# Patient Record
Sex: FEMALE | Race: WHITE | HISPANIC OR LATINO | Employment: FULL TIME | URBAN - METROPOLITAN AREA
[De-identification: names, ages, dates, MRNs, and addresses within clinical notes are randomized per-mention and may not be internally consistent; named-entity substitution may affect disease eponyms.]

---

## 2017-03-15 ENCOUNTER — OFFICE VISIT (OUTPATIENT)
Dept: FAMILY MEDICINE | Facility: CLINIC | Age: 37
End: 2017-03-15
Payer: COMMERCIAL

## 2017-03-15 VITALS
HEART RATE: 56 BPM | RESPIRATION RATE: 16 BRPM | DIASTOLIC BLOOD PRESSURE: 80 MMHG | SYSTOLIC BLOOD PRESSURE: 124 MMHG | WEIGHT: 168.3 LBS | OXYGEN SATURATION: 100 % | HEIGHT: 59 IN | BODY MASS INDEX: 33.93 KG/M2 | TEMPERATURE: 98.2 F

## 2017-03-15 DIAGNOSIS — G47.26 SHIFT WORK SLEEP DISORDER: ICD-10-CM

## 2017-03-15 DIAGNOSIS — G43.909 MIGRAINE WITHOUT STATUS MIGRAINOSUS, NOT INTRACTABLE, UNSPECIFIED MIGRAINE TYPE: Primary | ICD-10-CM

## 2017-03-15 PROBLEM — Z13.6 CARDIOVASCULAR SCREENING; LDL GOAL LESS THAN 160: Status: ACTIVE | Noted: 2017-03-15

## 2017-03-15 PROCEDURE — 99203 OFFICE O/P NEW LOW 30 MIN: CPT | Performed by: FAMILY MEDICINE

## 2017-03-15 RX ORDER — BUTALBITAL, ACETAMINOPHEN AND CAFFEINE 50; 325; 40 MG/1; MG/1; MG/1
1 TABLET ORAL EVERY 4 HOURS PRN
Qty: 40 TABLET | Refills: 2 | Status: SHIPPED
Start: 2017-03-15 | End: 2017-10-30

## 2017-03-15 RX ORDER — NORTRIPTYLINE HCL 25 MG
25-50 CAPSULE ORAL AT BEDTIME
Qty: 60 CAPSULE | Refills: 2 | Status: SHIPPED | OUTPATIENT
Start: 2017-03-15 | End: 2020-10-13

## 2017-03-15 RX ORDER — BUTALBITAL, ACETAMINOPHEN AND CAFFEINE 50; 325; 40 MG/1; MG/1; MG/1
1 TABLET ORAL EVERY 4 HOURS PRN
COMMUNITY
End: 2017-03-15

## 2017-03-15 ASSESSMENT — PAIN SCALES - GENERAL: PAINLEVEL: NO PAIN (0)

## 2017-03-15 NOTE — PROGRESS NOTES
SUBJECTIVE:                                                    Katherine Garcia is a 36 year old female who presents to clinic today for the following health issues:      Headache     Onset: ongoing issue - getting migraines more frequently during work week (pt works 7 on, 7 off - nights)    Description:   Location: bilateral in the frontal area, bilateral in the temporal area   Character: sharp pain, squeezing pain  Frequency:  2 days weekly (during work week)  Duration:  3 days if not taking medications    Intensity: severe    Progression of Symptoms:  worsening    Accompanying Signs & Symptoms:  Stiff neck: no  Neck or upper back pain: no  Fever: no  Sinus pressure: no  Nausea or vomiting: no  Dizziness: YES  Numbness: no  Weakness: YES  Visual changes: no   History:   Head trauma: no  Family history of migraines: no  Previous tests for headaches: YES  Neurologist evaluations: YES- long while   Able to do daily activities: YES  Wake with a headaches: no  Do headaches wake you up: YES- in the past  Daily pain medication use: YES- pt tries Ibuprofen with early onset of sx's  Work/school stressors/changes: no    Precipitating factors:   Does light make it worse: YES  Does sound make it worse: YES- can    Alleviating factors:  Does sleep help: no         Therapies Tried and outcome: Fioricet and Ibuprofen (Advil, Motrin)    SUBJECTIVE:  Here today to discuss migraines, with symptoms as above. New to our clinic - I take care of her  as well. She has had a long-standing history with migraine headaches. Has tried Imitrex and similar in the past but this worsens her headaches. Check she responds quite well to Fioricet. Has been working overnights with some shifting schedules this is been making sleep difficult. That seems to have made her headaches more frequent. Also having trouble with falling asleep at work.    Review of systems otherwise negative.  Past medical, family, and social history reviewed and  "updated in chart.    OBJECTIVE:  /80 (BP Location: Right arm, Patient Position: Right side, Cuff Size: Adult Regular)  Pulse 56  Temp 98.2  F (36.8  C) (Oral)  Resp 16  Ht 1.499 m (4' 11\")  Wt 76.3 kg (168 lb 4.8 oz)  LMP  (Exact Date)  SpO2 100%  BMI 33.99 kg/m2  Alert, pleasant, upbeat, and in no apparent discomfort.  Eye exam is normal - PONCE, EOMI, fundi normal, corneas normal, no foreign bodies, visual acuity normal both eyes, no periorbital cellulitis.   Ears normal. Throat and pharynx normal. Neck supple. No adenopathy or masses in the neck or supraclavicular regions. Sinuses non tender.  S1 and S2 normal, no murmurs, clicks, gallops or rubs. Regular rate and rhythm. Chest is clear; no wheezes or rales. No edema or JVD.  I note only benign skin findings. No unusual rashes or suspicious skin lesions noted. Nails appear normal.     ASSESSMENT / PLAN:  (G43.409) Migraine without status migrainosus, not intractable, unspecified migraine type  (primary encounter diagnosis)  Comment: From a treatment standpoint the Fioricet has been working great and I will provide refills. From a prevention standpoint my guess is improving her sleep will help lessen the frequency and intensity. We will try nortriptyline 1-2 tablets before sleep and see if that can't make things more efficient, help with the headaches, lessen drowsiness at work  Plan: butalbital-acetaminophen-caffeine (FIORICET)         -40 MG per tablet        Discussed mechanism of action of the proposed medication, as well as potential effects, both good and bad.  Patient expressed understanding and agreed with treatment.     (G47.26) Shift work sleep disorder  Comment: As above  Plan: butalbital-acetaminophen-caffeine (FIORICET)         -40 MG per tablet, nortriptyline         (PAMELOR) 25 MG capsule            Follow up - contact me in 2-3 weeks  ELISEO Gambino MD    (Chart documentation completed in part with Dragon voice-recognition " software.  Even though reviewed some grammatical, spelling, and word errors may remain.)

## 2017-03-15 NOTE — NURSING NOTE
"Chief Complaint   Patient presents with     Headache       Initial /80 (BP Location: Right arm, Patient Position: Right side, Cuff Size: Adult Regular)  Pulse 56  Temp 98.2  F (36.8  C) (Oral)  Resp 16  Ht 1.499 m (4' 11\")  Wt 76.3 kg (168 lb 4.8 oz)  LMP  (Exact Date)  SpO2 100%  BMI 33.99 kg/m2 Estimated body mass index is 33.99 kg/(m^2) as calculated from the following:    Height as of this encounter: 1.499 m (4' 11\").    Weight as of this encounter: 76.3 kg (168 lb 4.8 oz).  Medication Reconciliation: complete     Will Sandeep COTO      "

## 2017-03-15 NOTE — MR AVS SNAPSHOT
"              After Visit Summary   3/15/2017    Katherine Garcia    MRN: [id number]           Patient Information     Date Of Birth          1980        Visit Information        Provider Department      3/15/2017 9:40 AM Margret Gambino MD [facility]        Today's Diagnoses     Migraine without status migrainosus, not intractable, unspecified migraine type    -  1    Shift work sleep disorder           Follow-ups after your visit        Follow-up notes from your care team     Return if symptoms worsen or fail to improve.      Who to contact     If you have questions or need follow up information about today's clinic visit or your schedule please contact [facility] directly at [phone].  Normal or non-critical lab and imaging results will be communicated to you by MyChart, letter or phone within 4 business days after the clinic has received the results. If you do not hear from us within 7 days, please contact the clinic through MyChart or phone. If you have a critical or abnormal lab result, we will notify you by phone as soon as possible.  Submit refill requests through [employer] or call your pharmacy and they will forward the refill request to us. Please allow 3 business days for your refill to be completed.          Additional Information About Your Visit        MyChart Information     [employer] lets you send messages to your doctor, view your test results, renew your prescriptions, schedule appointments and more. To sign up, go to [email or web address] . Click on \"Log in\" on the left side of the screen, which will take you to the Welcome page. Then click on \"Sign up Now\" on the right side of the page.     You will be asked to enter the access code listed below, as well as some personal information. Please follow the directions to create your username and password.     Your access code is: [email or web address]  Expires: 6/13/2017 10:01 AM     Your access code will " " in 90 days. If you need help or a new code, please call your [facility] or [phone].        Care EveryWhere ID     This is your Care EveryWhere ID. This could be used by other organizations to access your [place] medical records  [id number]        Your Vitals Were     Pulse Temperature Respirations Height Last Period Pulse Oximetry    56 98.2  F (36.8  C) (Oral) 16 1.499 m (4' 11\") (Exact Date) 100%    BMI (Body Mass Index)                   33.99 kg/m2            Blood Pressure from Last 3 Encounters:   03/15/17 124/80   13 118/70    Weight from Last 3 Encounters:   03/15/17 76.3 kg (168 lb 4.8 oz)   13 79.4 kg (175 lb)              Today, you had the following     No orders found for display         Today's Medication Changes          These changes are accurate as of: 3/15/17 10:01 AM.  If you have any questions, ask your nurse or doctor.               Start taking these medicines.        Dose/Directions    nortriptyline 25 MG capsule   Commonly known as:  PAMELOR   Used for:  Shift work sleep disorder, Migraine without status migrainosus, not intractable, unspecified migraine type   Started by:  Margret Gambino MD        Dose:  25-50 mg   Take 1-2 capsules (25-50 mg) by mouth At Bedtime   Quantity:  60 capsule   Refills:  2            Where to get your medicines      [facility] [address] [place] [address] AT [facility]  [address] [place] [address]     Phone:  [phone]     butalbital-acetaminophen-caffeine -40 MG per tablet    nortriptyline 25 MG capsule                Primary Care Provider Office Phone #    [facility] [phone]       No address on file        Thank you!     Thank you for choosing [facility]  for your care. Our goal is always to provide you with excellent care. Hearing back from our patients is one way we can continue to " improve our services. Please take a few minutes to complete the written survey that you may receive in the mail after your visit with us. Thank you!             Your Updated Medication List - Protect others around you: Learn how to safely use, store and throw away your medicines at www.disposemymeds.org.          This list is accurate as of: 3/15/17 10:01 AM.  Always use your most recent med list.                   Brand Name Dispense Instructions for use    butalbital-acetaminophen-caffeine -40 MG per tablet    FIORICET    40 tablet    Take 1 tablet by mouth every 4 hours as needed       nortriptyline 25 MG capsule    PAMELOR    60 capsule    Take 1-2 capsules (25-50 mg) by mouth At Bedtime

## 2017-07-10 ENCOUNTER — APPOINTMENT (OUTPATIENT)
Dept: CT IMAGING | Facility: CLINIC | Age: 37
DRG: 758 | End: 2017-07-10
Attending: EMERGENCY MEDICINE
Payer: COMMERCIAL

## 2017-07-10 ENCOUNTER — HOSPITAL ENCOUNTER (INPATIENT)
Facility: CLINIC | Age: 37
LOS: 1 days | Discharge: HOME | DRG: 758 | End: 2017-07-12
Attending: EMERGENCY MEDICINE | Admitting: OBSTETRICS & GYNECOLOGY
Payer: COMMERCIAL

## 2017-07-10 ENCOUNTER — OFFICE VISIT (OUTPATIENT)
Dept: URGENT CARE | Facility: URGENT CARE | Age: 37
End: 2017-07-10
Payer: COMMERCIAL

## 2017-07-10 ENCOUNTER — APPOINTMENT (OUTPATIENT)
Dept: ULTRASOUND IMAGING | Facility: CLINIC | Age: 37
DRG: 758 | End: 2017-07-10
Attending: EMERGENCY MEDICINE
Payer: COMMERCIAL

## 2017-07-10 ENCOUNTER — RADIANT APPOINTMENT (OUTPATIENT)
Dept: GENERAL RADIOLOGY | Facility: CLINIC | Age: 37
End: 2017-07-10
Attending: PHYSICIAN ASSISTANT
Payer: COMMERCIAL

## 2017-07-10 VITALS
BODY MASS INDEX: 32.11 KG/M2 | HEART RATE: 66 BPM | OXYGEN SATURATION: 98 % | WEIGHT: 159 LBS | DIASTOLIC BLOOD PRESSURE: 80 MMHG | SYSTOLIC BLOOD PRESSURE: 119 MMHG | TEMPERATURE: 98.3 F

## 2017-07-10 DIAGNOSIS — N83.519 OVARIAN TORSION: ICD-10-CM

## 2017-07-10 DIAGNOSIS — R10.9 ACUTE LEFT FLANK PAIN: ICD-10-CM

## 2017-07-10 DIAGNOSIS — R10.30 LOWER ABDOMINAL PAIN: Primary | ICD-10-CM

## 2017-07-10 DIAGNOSIS — R10.9 FLANK PAIN: Primary | ICD-10-CM

## 2017-07-10 DIAGNOSIS — N73.9 PID (PELVIC INFLAMMATORY DISEASE): ICD-10-CM

## 2017-07-10 LAB
ALBUMIN UR-MCNC: NEGATIVE MG/DL
ANION GAP SERPL CALCULATED.3IONS-SCNC: 6 MMOL/L (ref 3–14)
APPEARANCE UR: CLEAR
BASOPHILS # BLD AUTO: 0 10E9/L (ref 0–0.2)
BASOPHILS NFR BLD AUTO: 0.2 %
BILIRUB UR QL STRIP: NEGATIVE
BUN SERPL-MCNC: 11 MG/DL (ref 7–30)
CALCIUM SERPL-MCNC: 9.1 MG/DL (ref 8.5–10.1)
CHLORIDE SERPL-SCNC: 110 MMOL/L (ref 94–109)
CO2 SERPL-SCNC: 25 MMOL/L (ref 20–32)
COLOR UR AUTO: YELLOW
CREAT SERPL-MCNC: 0.89 MG/DL (ref 0.52–1.04)
DIFFERENTIAL METHOD BLD: NORMAL
EOSINOPHIL # BLD AUTO: 0.2 10E9/L (ref 0–0.7)
EOSINOPHIL NFR BLD AUTO: 2.4 %
ERYTHROCYTE [DISTWIDTH] IN BLOOD BY AUTOMATED COUNT: 14.9 % (ref 10–15)
GFR SERPL CREATININE-BSD FRML MDRD: 72 ML/MIN/1.7M2
GLUCOSE SERPL-MCNC: 109 MG/DL (ref 70–99)
GLUCOSE UR STRIP-MCNC: NEGATIVE MG/DL
HCT VFR BLD AUTO: 41.5 % (ref 35–47)
HGB BLD-MCNC: 13.6 G/DL (ref 11.7–15.7)
HGB UR QL STRIP: NEGATIVE
IMM GRANULOCYTES # BLD: 0 10E9/L (ref 0–0.4)
IMM GRANULOCYTES NFR BLD: 0.1 %
KETONES UR STRIP-MCNC: NEGATIVE MG/DL
LEUKOCYTE ESTERASE UR QL STRIP: NEGATIVE
LYMPHOCYTES # BLD AUTO: 1.8 10E9/L (ref 0.8–5.3)
LYMPHOCYTES NFR BLD AUTO: 21.3 %
MCH RBC QN AUTO: 26.6 PG (ref 26.5–33)
MCHC RBC AUTO-ENTMCNC: 32.8 G/DL (ref 31.5–36.5)
MCV RBC AUTO: 81 FL (ref 78–100)
MICRO REPORT STATUS: NORMAL
MONOCYTES # BLD AUTO: 0.5 10E9/L (ref 0–1.3)
MONOCYTES NFR BLD AUTO: 5.8 %
NEUTROPHILS # BLD AUTO: 5.8 10E9/L (ref 1.6–8.3)
NEUTROPHILS NFR BLD AUTO: 70.2 %
NITRATE UR QL: NEGATIVE
NRBC # BLD AUTO: 0 10*3/UL
NRBC BLD AUTO-RTO: 0 /100
PH UR STRIP: 6 PH (ref 5–7)
PLATELET # BLD AUTO: 168 10E9/L (ref 150–450)
POTASSIUM SERPL-SCNC: 3.8 MMOL/L (ref 3.4–5.3)
RBC # BLD AUTO: 5.11 10E12/L (ref 3.8–5.2)
SODIUM SERPL-SCNC: 141 MMOL/L (ref 133–144)
SP GR UR STRIP: 1.02 (ref 1–1.03)
SPECIMEN SOURCE: NORMAL
URN SPEC COLLECT METH UR: NORMAL
UROBILINOGEN UR STRIP-ACNC: 0.2 EU/DL (ref 0.2–1)
WBC # BLD AUTO: 8.3 10E9/L (ref 4–11)
WET PREP SPEC: NORMAL

## 2017-07-10 PROCEDURE — 99285 EMERGENCY DEPT VISIT HI MDM: CPT | Mod: 25 | Performed by: EMERGENCY MEDICINE

## 2017-07-10 PROCEDURE — 96376 TX/PRO/DX INJ SAME DRUG ADON: CPT | Performed by: EMERGENCY MEDICINE

## 2017-07-10 PROCEDURE — 80048 BASIC METABOLIC PNL TOTAL CA: CPT | Performed by: EMERGENCY MEDICINE

## 2017-07-10 PROCEDURE — 25000128 H RX IP 250 OP 636: Performed by: EMERGENCY MEDICINE

## 2017-07-10 PROCEDURE — 36415 COLL VENOUS BLD VENIPUNCTURE: CPT | Performed by: EMERGENCY MEDICINE

## 2017-07-10 PROCEDURE — 96375 TX/PRO/DX INJ NEW DRUG ADDON: CPT | Mod: 59 | Performed by: EMERGENCY MEDICINE

## 2017-07-10 PROCEDURE — 99214 OFFICE O/P EST MOD 30 MIN: CPT | Performed by: PHYSICIAN ASSISTANT

## 2017-07-10 PROCEDURE — 74020 XR ABDOMEN 2 VW: CPT

## 2017-07-10 PROCEDURE — 81003 URINALYSIS AUTO W/O SCOPE: CPT | Performed by: PHYSICIAN ASSISTANT

## 2017-07-10 PROCEDURE — 87210 SMEAR WET MOUNT SALINE/INK: CPT | Performed by: PHYSICIAN ASSISTANT

## 2017-07-10 PROCEDURE — 76856 US EXAM PELVIC COMPLETE: CPT

## 2017-07-10 PROCEDURE — 99285 EMERGENCY DEPT VISIT HI MDM: CPT | Mod: Z6 | Performed by: EMERGENCY MEDICINE

## 2017-07-10 PROCEDURE — 85025 COMPLETE CBC W/AUTO DIFF WBC: CPT | Performed by: EMERGENCY MEDICINE

## 2017-07-10 PROCEDURE — 96374 THER/PROPH/DIAG INJ IV PUSH: CPT | Mod: 59 | Performed by: EMERGENCY MEDICINE

## 2017-07-10 PROCEDURE — 74176 CT ABD & PELVIS W/O CONTRAST: CPT

## 2017-07-10 PROCEDURE — 96361 HYDRATE IV INFUSION ADD-ON: CPT | Performed by: EMERGENCY MEDICINE

## 2017-07-10 RX ORDER — KETOROLAC TROMETHAMINE 15 MG/ML
15 INJECTION, SOLUTION INTRAMUSCULAR; INTRAVENOUS ONCE
Status: COMPLETED | OUTPATIENT
Start: 2017-07-10 | End: 2017-07-10

## 2017-07-10 RX ADMIN — KETOROLAC TROMETHAMINE 15 MG: 15 INJECTION, SOLUTION INTRAMUSCULAR; INTRAVENOUS at 20:53

## 2017-07-10 RX ADMIN — SODIUM CHLORIDE 1000 ML: 9 INJECTION, SOLUTION INTRAVENOUS at 21:15

## 2017-07-10 RX ADMIN — SODIUM CHLORIDE 1000 ML: 9 INJECTION, SOLUTION INTRAVENOUS at 20:20

## 2017-07-10 RX ADMIN — HYDROMORPHONE HYDROCHLORIDE 1 MG: 1 INJECTION, SOLUTION INTRAMUSCULAR; INTRAVENOUS; SUBCUTANEOUS at 20:26

## 2017-07-10 ASSESSMENT — ENCOUNTER SYMPTOMS
ABDOMINAL PAIN: 0
NAUSEA: 1
DYSURIA: 0
FLANK PAIN: 1
SHORTNESS OF BREATH: 0
FEVER: 0
HEMATURIA: 0
DIARRHEA: 0
COUGH: 0
VOMITING: 0

## 2017-07-10 NOTE — IP AVS SNAPSHOT
[facility] [address]    [address]    [place] [address]    Phone:  [phone]                                       After Visit Summary   7/10/2017    Katherine Garcia    MRN: [id number]           After Visit Summary Signature Page     I have received my discharge instructions, and my questions have been answered. I have discussed any challenges I see with this plan with the nurse or doctor.    ..........................................................................................................................................  Patient/Patient Representative Signature      ..........................................................................................................................................  Patient Representative Print Name and Relationship to Patient    ..................................................               ................................................  Date                                            Time    ..........................................................................................................................................  Reviewed by Signature/Title    ...................................................              ..............................................  Date                                                            Time

## 2017-07-10 NOTE — PROGRESS NOTES
SUBJECTIVE  HPI:  Katherine Garcia is a 36 year old female who presents with the CC of abdominal/pelvic pain.    Pain is located in the LLQ and left flank and  Radiation to the groin area, with radiation to left groin and suprapubic region.  The pain is characterized as sharp, stabbing and cramping, and at worst is a level 10 on a scale of 1-10.  Pain has been present for 14 hours(s) and is slowly progressive.  EXACERBATING FACTORS: movement/walking and position going from sitting to lying or lying to standing and walking with heel strike  RELIEVING FACTORS: nothing.  ASSOCIATED SX: none and specifically no gross hematuria or fever or night sweats or weight loss.     She is S/P hysterectomy and tubal ligation but still has ovaries retained.  She has no urinary symptoms at this time.  She has had vaginal spotting occasionally in the past    No past medical history on file.  Current Outpatient Prescriptions   Medication Sig Dispense Refill     butalbital-acetaminophen-caffeine (FIORICET) -40 MG per tablet Take 1 tablet by mouth every 4 hours as needed 40 tablet 2     nortriptyline (PAMELOR) 25 MG capsule Take 1-2 capsules (25-50 mg) by mouth At Bedtime 60 capsule 2     Social History   Substance Use Topics     Smoking status: Never Smoker     Smokeless tobacco: Never Used     Alcohol use Yes       ROS:  Review of systems negative except as stated above.    OBJECTIVE:  There were no vitals taken for this visit.  GENERAL APPEARANCE: alert and appears in actue distress/pain  She has to ambulate deliberately because of pain.  EYES: EOMI,  PERRL, conjunctiva clear  HENT: ear canals and TM's normal.  Nose and mouth without ulcers, erythema or lesions  NECK: supple, nontender, no lymphadenopathy  CV: regular rates and rhythm, normal S1 S2, no murmur noted  ABDOMEN:  soft, tender to left upper quadrant and left flank pain radiates to groin   She has suprapubic tenderness to palpation and reproduces her sense to  urinate.  Painful left sided CVA tenderness to percussion  NEURO: Normal strength and tone, sensory exam grossly normal,  normal speech and mentation  She is able to get up from chair and walk. She walks deliberately because of the abdominal pain.  SKIN: no suspicious lesions or rashes    Lab:  Results for orders placed or performed in visit on 07/10/17   *UA reflex to Microscopic and Culture ([place] [facility] (except [place] and [place])   Result Value Ref Range    Color Urine Yellow     Appearance Urine Clear     Glucose Urine Negative NEG mg/dL    Bilirubin Urine Negative NEG    Ketones Urine Negative NEG mg/dL    Specific Gravity Urine 1.020 1.003 - 1.035    Blood Urine Negative NEG    pH Urine 6.0 5.0 - 7.0 pH    Protein Albumin Urine Negative NEG mg/dL    Urobilinogen Urine 0.2 0.2 - 1.0 EU/dL    Nitrite Urine Negative NEG    Leukocyte Esterase Urine Negative NEG    Source Midstream Urine    Wet prep   Result Value Ref Range    Specimen Description Vagina     Wet Prep       No Trichomonas seen  No clue cells seen  No yeast seen      Micro Report Status FINAL 07/10/2017      Xray:  KUB abdominal xray has no noted stones on Xray. This is my own personal interpretation and radiologist will over-read films tomorrow.    ASSESSMENT:  Left flank pain    Plan:  Patient has severe flank pain. Multiple etiologies and diagnoses were considered to include ovarian and abdominal pain origin. She has flank pain but no prior history of nephrolithiasis and no gross hematuria or other urinary symptoms.  No blood noted on urine dip but she has left CVA and flank pain  She is sent to the ER by personal vehicle by  who will drive her to the ER to be evaluated with further imaging and work.

## 2017-07-10 NOTE — IP AVS SNAPSHOT
MRN:[id number]                      After Visit Summary   7/10/2017    Katherine Garcia    MRN: [id number]           Thank you!     Thank you for choosing [facility] for your care. Our goal is always to provide you with excellent care. Hearing back from our patients is one way we can continue to improve our services. Please take a few minutes to complete the written survey that you may receive in the mail after you visit with us. Thank you!        Patient Information     Date Of Birth          1980        Designated Caregiver       Most Recent Value    Caregiver    Will someone help with your care after discharge? yes    Name of designated caregiver       About your hospital stay     You were admitted on:  July 11, 2017 You last received care in the:  [facility] [address] [facility]    You were discharged on:  July 12, 2017        Reason for your hospital stay       Pelvic pain                  Who to Call     For medical emergencies, please call 911.  For non-urgent questions about your medical care, please call your primary care provider or clinic, [phone]          Attending Provider     Provider Specialty    Bhanu Timmons MD Emergency Medicine    [facility], Rufina Martínez MD OB/Gyn       Primary Care Provider Office Phone # Fax #    Margret Braden Gambino [phone]       When to contact your care team       Call the clinic or return to the ED if you have any of the following:   - Temperature greater than 100.4F  - Pain not controlled by pain medications  - Uncontrolled nausea/vomiting  - Foul-smelling vaginal discharge  - Vaginal bleeding soaking 1 pad per hour for 2 hours in a row                  After Care Instructions     Activity       Your activity upon discharge as tolerated            Diet       Follow this diet upon discharge: regular diet                  Follow-up Appointments     Follow Up and recommended labs and tests       Follow up with OB/GYN in  "one week                  Pending Results     Date and Time Order Name Status Description    2017 0448 Chlamydia trachomatis PCR In process     2017 0448 Neisseria gonorrhoea PCR In process             Statement of Approval     Ordered          17 0907  I have reviewed and agree with all the recommendations and orders detailed in this document.  EFFECTIVE NOW     Approved and electronically signed by:  Lisette Yoder MD             Admission Information     Date & Time Provider Department Dept. Phone    7/10/2017 Rufina Funez MD [facility] [address] [facility] [phone]      Your Vitals Were     Blood Pressure Pulse Temperature Respirations Weight Last Period     (BP Location: Left arm) 55 97.3  F (36.3  C) (Oral) 14 71.4 kg (157 lb 6.4 oz) (Exact Date)    Pulse Oximetry BMI (Body Mass Index)                97% 31.79 kg/m2          MyChart Information     [employer] lets you send messages to your doctor, view your test results, renew your prescriptions, schedule appointments and more. To sign up, go to [email or web address] . Click on \"Log in\" on the left side of the screen, which will take you to the Welcome page. Then click on \"Sign up Now\" on the right side of the page.     You will be asked to enter the access code listed below, as well as some personal information. Please follow the directions to create your username and password.     Your access code is: [email or web address]  Expires: 10/9/2017 10:16 AM     Your access code will  in 90 days. If you need help or a new code, please call your [place] clinic or [phone].        Care EveryWhere ID     This is your Care EveryWhere ID. This could be used by other organizations to access your [place] medical records  [id number]        Equal Access to Services     DENA KAY : Ascencion Skelton, angeline jasmine, lyla mccoy. So [facility] [phone].    ATENCIÓN: Si " orlando bush, tiene a sierra disposición servicios gratuitos de asistencia lingüística. Samuel sutton [phone].    We comply with applicable federal civil rights laws and [place] laws. We do not discriminate on the basis of race, color, national origin, age, disability sex, sexual orientation or gender identity.               Review of your medicines      START taking        Dose / Directions    doxycycline 100 MG capsule   Commonly known as:  VIBRAMYCIN   Indication:  Infection Within the Abdomen, PID   Used for:  PID (pelvic inflammatory disease)        Dose:  100 mg   Take 1 capsule (100 mg) by mouth every 12 hours   Quantity:  25 capsule   Refills:  0       ibuprofen 600 MG tablet   Commonly known as:  ADVIL/MOTRIN   Used for:  PID (pelvic inflammatory disease)        Dose:  600 mg   Take 1 tablet (600 mg) by mouth every 6 hours as needed for moderate pain   Quantity:  30 tablet   Refills:  0         CONTINUE these medicines which have NOT CHANGED        Dose / Directions    butalbital-acetaminophen-caffeine -40 MG per tablet   Commonly known as:  FIORICET   Used for:  Migraine without status migrainosus, not intractable, unspecified migraine type        Dose:  1 tablet   Take 1 tablet by mouth every 4 hours as needed   Quantity:  40 tablet   Refills:  2       nortriptyline 25 MG capsule   Commonly known as:  PAMELOR   Used for:  Shift work sleep disorder, Migraine without status migrainosus, not intractable, unspecified migraine type        Dose:  25-50 mg   Take 1-2 capsules (25-50 mg) by mouth At Bedtime   Quantity:  60 capsule   Refills:  2            Where to get your medicines      These medications were sent to [facility] [place], [place] - [address]  [address] [address]     Phone:  [phone]     doxycycline 100 MG capsule    ibuprofen 600 MG tablet                Protect others around you: Learn how to safely use, store and throw away your medicines  at www.disposemymeds.org.             Medication List: This is a list of all your medications and when to take them. Check marks below indicate your daily home schedule. Keep this list as a reference.      Medications           Morning Afternoon Evening Bedtime As Needed    butalbital-acetaminophen-caffeine -40 MG per tablet   Commonly known as:  FIORICET   Take 1 tablet by mouth every 4 hours as needed   Next Dose Due:  Available anytime                                   doxycycline 100 MG capsule   Commonly known as:  VIBRAMYCIN   Take 1 capsule (100 mg) by mouth every 12 hours   Last time this was given:  100 mg on 7/11/2017  7:18 PM   Next Dose Due:  Will give this AM, then take in PM                                      ibuprofen 600 MG tablet   Commonly known as:  ADVIL/MOTRIN   Take 1 tablet (600 mg) by mouth every 6 hours as needed for moderate pain   Last time this was given:  600 mg on 7/11/2017  5:34 AM   Next Dose Due:  Given Toradol at 0800A - take anytime after 2P                                   nortriptyline 25 MG capsule   Commonly known as:  PAMELOR   Take 1-2 capsules (25-50 mg) by mouth At Bedtime   Next Dose Due:  Take tonight at bedtime

## 2017-07-10 NOTE — LETTER
Transition Communication Hand-off for Care Transitions to Next Level of Care Provider    Name: Katherine Garcia  MRN #: [id number]  Primary Care Provider: Margret Gambino     Primary Clinic: [address] [address]     Reason for Hospitalization:  Ovarian torsion [N83.519]  Admit Date/Time: 7/10/2017  8:06 PM  Expected Discharge Date:  7/12/17  Payor Source: Payor: MEDICA / Plan: MEDICA CHOICE / Product Type: Indemnity /     Reason for Communication Hand-off Referral: Patient of Dr. Gambino    Discharge Plan: home     Follow-up plan:  Will f/u with OB/GYN    AVS/Discharge Summary included

## 2017-07-10 NOTE — MR AVS SNAPSHOT
"              After Visit Summary   7/10/2017    Katherine Garcia    MRN: [id number]           Patient Information     Date Of Birth          1980        Visit Information        Provider Department      7/10/2017 5:20 PM Robert Boss PA-C [facility]        Today's Diagnoses     Flank pain    -  1    Acute left flank pain           Follow-ups after your visit        Future tests that were ordered for you today     Open Standing Orders        Priority Remaining Interval Expires Ordered    Activity: Up ad emely Routine [id number] PRN  7/11/2017            Who to contact     If you have questions or need follow up information about today's clinic visit or your schedule please contact [facility] directly at [phone].  Normal or non-critical lab and imaging results will be communicated to you by Break[employer], letter or phone within 4 business days after the clinic has received the results. If you do not hear from us within 7 days, please contact the clinic through Break30hart or phone. If you have a critical or abnormal lab result, we will notify you by phone as soon as possible.  Submit refill requests through [employer] or call your pharmacy and they will forward the refill request to us. Please allow 3 business days for your refill to be completed.          Additional Information About Your Visit        Break30hart Information     [employer] lets you send messages to your doctor, view your test results, renew your prescriptions, schedule appointments and more. To sign up, go to [email or web address] . Click on \"Log in\" on the left side of the screen, which will take you to the Welcome page. Then click on \"Sign up Now\" on the right side of the page.     You will be asked to enter the access code listed below, as well as some personal information. Please follow the directions to create your username and password.     Your access code is: [email or web address]  Expires: 10/9/2017 " 10:16 AM     Your access code will  in 90 days. If you need help or a new code, please call your [place] clinic or [phone].        Care EveryWhere ID     This is your Care EveryWhere ID. This could be used by other organizations to access your [place] medical records  [id number]        Your Vitals Were     Pulse Temperature Last Period Pulse Oximetry BMI (Body Mass Index)       66 98.3  F (36.8  C) (Oral) (Exact Date) 98% 32.11 kg/m2        Blood Pressure from Last 3 Encounters:   17 102/48   07/10/17 119/80   03/15/17 124/80    Weight from Last 3 Encounters:   07/10/17 157 lb 6.4 oz (71.4 kg)   07/10/17 159 lb (72.1 kg)   03/15/17 168 lb 4.8 oz (76.3 kg)              We Performed the Following     *UA reflex to Microscopic and Culture ([place] and [facility] (except [place] and [place])     Wet prep        Primary Care Provider Office Phone # Fax #    Margret Braden Gambino [phone]       [address] [address]        Equal Access to Services     DENA KAY : Hadii colette ku hadasho Soomaali, waaxda luqadaha, qaybta kaalmada adeegyada, lyla satniago. So [facility] [phone].    ATENCIÓN: Si habla español, tiene a sierra disposición servicios gratuitos de asistencia lingüística. Llame al [phone].    We comply with applicable federal civil rights laws and [place] laws. We do not discriminate on the basis of race, color, national origin, age, disability sex, sexual orientation or gender identity.            Thank you!     Thank you for choosing [facility]  for your care. Our goal is always to provide you with excellent care. Hearing back from our patients is one way we can continue to improve our services. Please take a few minutes to complete the written survey that you may receive in the mail after your visit with us. Thank you!             Your Updated Medication List  - Protect others around you: Learn how to safely use, store and throw away your medicines at www.disposemymeds.org.          This list is accurate as of: 7/10/17  8:06 PM.  Always use your most recent med list.                   Brand Name Dispense Instructions for use Diagnosis    butalbital-acetaminophen-caffeine -40 MG per tablet    FIORICET    40 tablet    Take 1 tablet by mouth every 4 hours as needed    Migraine without status migrainosus, not intractable, unspecified migraine type       nortriptyline 25 MG capsule    PAMELOR    60 capsule    Take 1-2 capsules (25-50 mg) by mouth At Bedtime    Shift work sleep disorder, Migraine without status migrainosus, not intractable, unspecified migraine type

## 2017-07-10 NOTE — NURSING NOTE
"Chief Complaint   Patient presents with     Urgent Care     Pt in clinic c/o left side flank pain that radiates into groin area.     Flank Pain     Groin Pain       Initial /80  Pulse 66  Temp 98.3  F (36.8  C) (Oral)  Wt 159 lb (72.1 kg)  SpO2 98%  BMI 32.11 kg/m2 Estimated body mass index is 32.11 kg/(m^2) as calculated from the following:    Height as of 3/15/17: 4' 11\" (1.499 m).    Weight as of this encounter: 159 lb (72.1 kg).  Medication Reconciliation: complete   Margot Phillip/ MA    "

## 2017-07-11 PROBLEM — R10.30 LOWER ABDOMINAL PAIN: Status: ACTIVE | Noted: 2017-07-11

## 2017-07-11 PROBLEM — Z87.440 H/O URINARY TRACT INFECTION: Status: ACTIVE | Noted: 2017-07-11

## 2017-07-11 PROBLEM — N94.9 CERVICAL MOTION TENDERNESS: Status: ACTIVE | Noted: 2017-07-11

## 2017-07-11 PROBLEM — R10.2 PELVIC PAIN IN FEMALE: Status: ACTIVE | Noted: 2017-07-11

## 2017-07-11 LAB
ALBUMIN UR-MCNC: NEGATIVE MG/DL
APPEARANCE UR: ABNORMAL
BACTERIA #/AREA URNS HPF: ABNORMAL /HPF
BILIRUB UR QL STRIP: NEGATIVE
COLOR UR AUTO: YELLOW
GLUCOSE UR STRIP-MCNC: NEGATIVE MG/DL
HCG UR QL: NEGATIVE
HGB UR QL STRIP: NEGATIVE
KETONES UR STRIP-MCNC: 40 MG/DL
LEUKOCYTE ESTERASE UR QL STRIP: NEGATIVE
MUCOUS THREADS #/AREA URNS LPF: PRESENT /LPF
NITRATE UR QL: NEGATIVE
PH UR STRIP: 5.5 PH (ref 5–7)
RADIOLOGIST FLAGS: ABNORMAL
RBC #/AREA URNS AUTO: 1 /HPF (ref 0–2)
SP GR UR STRIP: 1.02 (ref 1–1.03)
SQUAMOUS #/AREA URNS AUTO: 1 /HPF (ref 0–1)
URN SPEC COLLECT METH UR: ABNORMAL
UROBILINOGEN UR STRIP-MCNC: NORMAL MG/DL (ref 0–2)
WBC #/AREA URNS AUTO: 1 /HPF (ref 0–2)

## 2017-07-11 PROCEDURE — 81001 URINALYSIS AUTO W/SCOPE: CPT | Performed by: OBSTETRICS & GYNECOLOGY

## 2017-07-11 PROCEDURE — 25000132 ZZH RX MED GY IP 250 OP 250 PS 637: Performed by: STUDENT IN AN ORGANIZED HEALTH CARE EDUCATION/TRAINING PROGRAM

## 2017-07-11 PROCEDURE — 25000128 H RX IP 250 OP 636: Performed by: OBSTETRICS & GYNECOLOGY

## 2017-07-11 PROCEDURE — 25000128 H RX IP 250 OP 636: Performed by: EMERGENCY MEDICINE

## 2017-07-11 PROCEDURE — 81025 URINE PREGNANCY TEST: CPT | Performed by: OBSTETRICS & GYNECOLOGY

## 2017-07-11 PROCEDURE — 25000132 ZZH RX MED GY IP 250 OP 250 PS 637: Performed by: OBSTETRICS & GYNECOLOGY

## 2017-07-11 PROCEDURE — 87591 N.GONORRHOEAE DNA AMP PROB: CPT | Performed by: OBSTETRICS & GYNECOLOGY

## 2017-07-11 PROCEDURE — 12000001 ZZH R&B MED SURG/OB UMMC

## 2017-07-11 PROCEDURE — 25000125 ZZHC RX 250: Performed by: OBSTETRICS & GYNECOLOGY

## 2017-07-11 PROCEDURE — 87491 CHLMYD TRACH DNA AMP PROBE: CPT | Performed by: OBSTETRICS & GYNECOLOGY

## 2017-07-11 RX ORDER — LIDOCAINE 40 MG/G
CREAM TOPICAL
Status: DISCONTINUED | OUTPATIENT
Start: 2017-07-11 | End: 2017-07-12 | Stop reason: HOSPADM

## 2017-07-11 RX ORDER — KETOROLAC TROMETHAMINE 30 MG/ML
30 INJECTION, SOLUTION INTRAMUSCULAR; INTRAVENOUS EVERY 6 HOURS PRN
Status: DISCONTINUED | OUTPATIENT
Start: 2017-07-11 | End: 2017-07-11

## 2017-07-11 RX ORDER — BISACODYL 5 MG
5-15 TABLET, DELAYED RELEASE (ENTERIC COATED) ORAL DAILY PRN
Status: DISCONTINUED | OUTPATIENT
Start: 2017-07-11 | End: 2017-07-12 | Stop reason: HOSPADM

## 2017-07-11 RX ORDER — DOXYCYCLINE 100 MG/1
100 CAPSULE ORAL EVERY 12 HOURS SCHEDULED
Status: DISCONTINUED | OUTPATIENT
Start: 2017-07-11 | End: 2017-07-12 | Stop reason: HOSPADM

## 2017-07-11 RX ORDER — HYDROXYZINE HYDROCHLORIDE 50 MG/1
100 TABLET, FILM COATED ORAL
Status: COMPLETED | OUTPATIENT
Start: 2017-07-11 | End: 2017-07-11

## 2017-07-11 RX ORDER — ONDANSETRON 4 MG/1
4 TABLET, ORALLY DISINTEGRATING ORAL EVERY 6 HOURS PRN
Status: DISCONTINUED | OUTPATIENT
Start: 2017-07-11 | End: 2017-07-12 | Stop reason: HOSPADM

## 2017-07-11 RX ORDER — ONDANSETRON 2 MG/ML
4 INJECTION INTRAMUSCULAR; INTRAVENOUS EVERY 6 HOURS PRN
Status: DISCONTINUED | OUTPATIENT
Start: 2017-07-11 | End: 2017-07-12 | Stop reason: HOSPADM

## 2017-07-11 RX ORDER — HYDROMORPHONE HYDROCHLORIDE 1 MG/ML
.3-.5 INJECTION, SOLUTION INTRAMUSCULAR; INTRAVENOUS; SUBCUTANEOUS
Status: DISCONTINUED | OUTPATIENT
Start: 2017-07-11 | End: 2017-07-11 | Stop reason: CLARIF

## 2017-07-11 RX ORDER — PIPERACILLIN SODIUM, TAZOBACTAM SODIUM 3; .375 G/15ML; G/15ML
3.38 INJECTION, POWDER, LYOPHILIZED, FOR SOLUTION INTRAVENOUS EVERY 8 HOURS
Status: COMPLETED | OUTPATIENT
Start: 2017-07-11 | End: 2017-07-12

## 2017-07-11 RX ORDER — KETOROLAC TROMETHAMINE 30 MG/ML
15 INJECTION, SOLUTION INTRAMUSCULAR; INTRAVENOUS EVERY 6 HOURS
Status: DISCONTINUED | OUTPATIENT
Start: 2017-07-11 | End: 2017-07-12 | Stop reason: HOSPADM

## 2017-07-11 RX ORDER — NALOXONE HYDROCHLORIDE 0.4 MG/ML
.1-.4 INJECTION, SOLUTION INTRAMUSCULAR; INTRAVENOUS; SUBCUTANEOUS
Status: DISCONTINUED | OUTPATIENT
Start: 2017-07-11 | End: 2017-07-12 | Stop reason: HOSPADM

## 2017-07-11 RX ORDER — OXYCODONE HYDROCHLORIDE 5 MG/1
5-10 TABLET ORAL
Status: DISCONTINUED | OUTPATIENT
Start: 2017-07-11 | End: 2017-07-11 | Stop reason: CLARIF

## 2017-07-11 RX ORDER — ACETAMINOPHEN 325 MG/1
650 TABLET ORAL EVERY 4 HOURS PRN
Status: DISCONTINUED | OUTPATIENT
Start: 2017-07-11 | End: 2017-07-12 | Stop reason: HOSPADM

## 2017-07-11 RX ORDER — IBUPROFEN 600 MG/1
600 TABLET, FILM COATED ORAL EVERY 6 HOURS PRN
Status: DISCONTINUED | OUTPATIENT
Start: 2017-07-11 | End: 2017-07-11

## 2017-07-11 RX ORDER — CALCIUM CARBONATE 500 MG/1
500 TABLET, CHEWABLE ORAL DAILY PRN
Status: DISCONTINUED | OUTPATIENT
Start: 2017-07-11 | End: 2017-07-12 | Stop reason: HOSPADM

## 2017-07-11 RX ORDER — SODIUM CHLORIDE, SODIUM LACTATE, POTASSIUM CHLORIDE, CALCIUM CHLORIDE 600; 310; 30; 20 MG/100ML; MG/100ML; MG/100ML; MG/100ML
INJECTION, SOLUTION INTRAVENOUS CONTINUOUS
Status: DISCONTINUED | OUTPATIENT
Start: 2017-07-11 | End: 2017-07-12 | Stop reason: HOSPADM

## 2017-07-11 RX ORDER — HYDROMORPHONE HYDROCHLORIDE 1 MG/ML
0.5 INJECTION, SOLUTION INTRAMUSCULAR; INTRAVENOUS; SUBCUTANEOUS
Status: DISCONTINUED | OUTPATIENT
Start: 2017-07-11 | End: 2017-07-11

## 2017-07-11 RX ORDER — AMOXICILLIN 250 MG
1-2 CAPSULE ORAL 2 TIMES DAILY
Status: DISCONTINUED | OUTPATIENT
Start: 2017-07-11 | End: 2017-07-12 | Stop reason: HOSPADM

## 2017-07-11 RX ORDER — KETOROLAC TROMETHAMINE 30 MG/ML
30 INJECTION, SOLUTION INTRAMUSCULAR; INTRAVENOUS EVERY 6 HOURS
Status: DISCONTINUED | OUTPATIENT
Start: 2017-07-11 | End: 2017-07-11

## 2017-07-11 RX ADMIN — IBUPROFEN 600 MG: 600 TABLET ORAL at 05:34

## 2017-07-11 RX ADMIN — KETOROLAC TROMETHAMINE 15 MG: 30 INJECTION, SOLUTION INTRAMUSCULAR at 14:11

## 2017-07-11 RX ADMIN — SODIUM CHLORIDE, POTASSIUM CHLORIDE, SODIUM LACTATE AND CALCIUM CHLORIDE: 600; 310; 30; 20 INJECTION, SOLUTION INTRAVENOUS at 05:34

## 2017-07-11 RX ADMIN — ONDANSETRON 4 MG: 4 TABLET, ORALLY DISINTEGRATING ORAL at 07:34

## 2017-07-11 RX ADMIN — ACETAMINOPHEN 650 MG: 325 TABLET, FILM COATED ORAL at 20:13

## 2017-07-11 RX ADMIN — SENNOSIDES AND DOCUSATE SODIUM 2 TABLET: 8.6; 5 TABLET ORAL at 19:18

## 2017-07-11 RX ADMIN — DOXYCYCLINE HYCLATE 100 MG: 100 CAPSULE ORAL at 19:18

## 2017-07-11 RX ADMIN — HYDROXYZINE HYDROCHLORIDE 100 MG: 50 TABLET, FILM COATED ORAL at 21:44

## 2017-07-11 RX ADMIN — KETOROLAC TROMETHAMINE 15 MG: 30 INJECTION, SOLUTION INTRAMUSCULAR at 19:19

## 2017-07-11 RX ADMIN — HYDROMORPHONE HYDROCHLORIDE 0.5 MG: 1 INJECTION, SOLUTION INTRAMUSCULAR; INTRAVENOUS; SUBCUTANEOUS at 03:20

## 2017-07-11 RX ADMIN — PIPERACILLIN SODIUM,TAZOBACTAM SODIUM 3.38 G: 3; .375 INJECTION, POWDER, FOR SOLUTION INTRAVENOUS at 08:41

## 2017-07-11 RX ADMIN — HYDROMORPHONE HYDROCHLORIDE 0.3 MG: 1 INJECTION, SOLUTION INTRAMUSCULAR; INTRAVENOUS; SUBCUTANEOUS at 05:34

## 2017-07-11 RX ADMIN — SODIUM CHLORIDE, POTASSIUM CHLORIDE, SODIUM LACTATE AND CALCIUM CHLORIDE: 600; 310; 30; 20 INJECTION, SOLUTION INTRAVENOUS at 14:17

## 2017-07-11 RX ADMIN — PIPERACILLIN SODIUM,TAZOBACTAM SODIUM 3.38 G: 3; .375 INJECTION, POWDER, FOR SOLUTION INTRAVENOUS at 17:05

## 2017-07-11 ASSESSMENT — ACTIVITIES OF DAILY LIVING (ADL)
AMBULATION: 0-->INDEPENDENT
RETIRED_EATING: 0-->INDEPENDENT
FALL_HISTORY_WITHIN_LAST_SIX_MONTHS: NO
BATHING: 0-->INDEPENDENT
COGNITION: 0 - NO COGNITION ISSUES REPORTED
RETIRED_COMMUNICATION: 0-->UNDERSTANDS/COMMUNICATES WITHOUT DIFFICULTY
DRESS: 0-->INDEPENDENT
SWALLOWING: 0-->SWALLOWS FOODS/LIQUIDS WITHOUT DIFFICULTY
TRANSFERRING: 0-->INDEPENDENT
TOILETING: 0-->INDEPENDENT

## 2017-07-11 ASSESSMENT — ENCOUNTER SYMPTOMS
APPETITE CHANGE: 0
CHILLS: 0
DIFFICULTY URINATING: 0
ARTHRALGIAS: 0
MYALGIAS: 0
HEADACHES: 0

## 2017-07-11 NOTE — H&P
H&P - Ob/Gyn  Katherine Garcia   1980  MRN [id number]    CC: left sided flank and abdominal pain    HPI: Katherine Garcia is a 36 year old  who presented to the ED with left sided flank and abdominal pain. She is accompanied by her partner who is at the bedside and supportive. She reports the pain started around 4AM yesterday while she was at work. The pain is located in her left lower abdomen and radiates up to her flank and down to her suprapubic region. She describes pain episodes in which the pain would intensify for a few minutes and then lessen. After work she went home. She took some ibuprofen for the pain without relief. She was able to sleep during the day. When she woke up in the afternoon, the pain episodes worsened and the turned into a constant pain. That is when she decided to go to the ED. She presented to the ED around 4PM. She reports nausea but no vomiting with the pain episodes. She has vaginal spotting at baseline but denies any increase in vaginal bleeding. Denies dysuria, hematuria or urinary urgency/frequency. She denies any history of STIs and her last intercourse was with her  about 1 week ago.     14 point ROS negative other than pertinent points mentioned in the HPI.    OB Hx:   Gyn Hx:  STIs: denies  Pap smears: reports regular pap smears, all normal, does not remember date of last one      PMH: Migraines  PSH: hysterectomy   Meds: ibuprofen as needed      No current facility-administered medications on file prior to encounter.   Current Outpatient Prescriptions on File Prior to Encounter:  butalbital-acetaminophen-caffeine (FIORICET) -40 MG per tablet Take 1 tablet by mouth every 4 hours as needed   nortriptyline (PAMELOR) 25 MG capsule Take 1-2 capsules (25-50 mg) by mouth At Bedtime (Patient not taking: Reported on 7/10/2017)           Allergies   Allergen Reactions     Contrast Dye Anaphylaxis     Prednisone Anaphylaxis     Tylenol  W/Codeine [Acetaminophen-Codeine] Cramps        Social History     Social History     Marital status: Single     Spouse name: N/A     Number of children: N/A     Years of education: N/A     Occupational History     Not on file.     Social History Main Topics     Smoking status: Never Smoker     Smokeless tobacco: Never Used     Alcohol use No     Drug use: No     Sexual activity: Yes     Birth control/ protection: Female Surgical     Other Topics Concern     Not on file     Social History Narrative        Objective:  Vitals:    07/11/17 0130 07/11/17 0200 07/11/17 0230 07/11/17 0300   BP: 114/76 109/65 105/60 111/68   Pulse:       Resp:       Temp:       TempSrc:       SpO2: 100% 100% 100% 99%   Weight:          Gen: uncomfortable but no acute distress  Heart: RRR, no murmur gallop or rub  Lungs: clear to ascultation, no increased work of breathing  Abd: soft, non distended, tender to palpation on LLQ and suprapubic regions  Ext: trace LE edema, well perfuse    Labs/Imaging:  Results for orders placed or performed during the hospital encounter of 07/10/17 (from the past 24 hour(s))   CBC with platelets differential   Result Value Ref Range    WBC 8.3 4.0 - 11.0 10e9/L    RBC Count 5.11 3.8 - 5.2 10e12/L    Hemoglobin 13.6 11.7 - 15.7 g/dL    Hematocrit 41.5 35.0 - 47.0 %    MCV 81 78 - 100 fl    MCH 26.6 26.5 - 33.0 pg    MCHC 32.8 31.5 - 36.5 g/dL    RDW 14.9 10.0 - 15.0 %    Platelet Count 168 150 - 450 10e9/L    Diff Method Automated Method     % Neutrophils 70.2 %    % Lymphocytes 21.3 %    % Monocytes 5.8 %    % Eosinophils 2.4 %    % Basophils 0.2 %    % Immature Granulocytes 0.1 %    Nucleated RBCs 0 0 /100    Absolute Neutrophil 5.8 1.6 - 8.3 10e9/L    Absolute Lymphocytes 1.8 0.8 - 5.3 10e9/L    Absolute Monocytes 0.5 0.0 - 1.3 10e9/L    Absolute Eosinophils 0.2 0.0 - 0.7 10e9/L    Absolute Basophils 0.0 0.0 - 0.2 10e9/L    Abs Immature Granulocytes 0.0 0 - 0.4 10e9/L    Absolute Nucleated RBC 0.0    Basic  metabolic panel   Result Value Ref Range    Sodium 141 133 - 144 mmol/L    Potassium 3.8 3.4 - 5.3 mmol/L    Chloride 110 (H) 94 - 109 mmol/L    Carbon Dioxide 25 20 - 32 mmol/L    Anion Gap 6 3 - 14 mmol/L    Glucose 109 (H) 70 - 99 mg/dL    Urea Nitrogen 11 7 - 30 mg/dL    Creatinine 0.89 0.52 - 1.04 mg/dL    GFR Estimate 72 >60 mL/min/1.7m2    GFR Estimate If Black 87 >60 mL/min/1.7m2    Calcium 9.1 8.5 - 10.1 mg/dL   CT Abdomen Pelvis w/o Contrast (stone protocol)    Narrative    EXAMINATION: CT abdomen and pelvis without contrast, 7/10/2017 9:47 PM    TECHNIQUE:  Helical CT images from the lung bases through the  symphysis pubis were obtained without IV contrast.    COMPARISON: Abdominal radiograph 7/10/2017    HISTORY: Abdominal or left flank pain, possible stone.    FINDINGS:    Abdomen and pelvis: Liver, gallbladder, adrenal glands, spleen,  pancreas unremarkable.  No nephroureterolithiasis, hydronephrosis or hydroureter. Urinary  bladder is partially distended.  No bladder stone. No perinephric  fatty stranding.     No bowel obstruction. Descending and sigmoid colonic diverticulosis.  Evaluation of colonic wall is somewhat limited due to lack of IV  contrast, although there is no pericolonic fatty stranding to suggest  acute diverticulitis. Appendix is visualized and is normal in caliber  (series 5, image 224), without periappendiceal fatty stranding.     Uterus is surgically absent. Gas bubbles in the vaginal cuff. Ovaries  containing multiple small functional cysts. Slightly enlarged right  ovary compared to the left ovary. Minimal fatty stranding in the right  hemipelvis, predominantly surrounding the right ovary. Minimal fluid  in the pelvis, likely physiologic. No free air within the abdomen and  pelvis. No suspicious adenopathy within abdomen pelvis. Possible lymph  node versus epiploic appendage adjacent to the anterior ascending  colon (series 5, image 184).    Lung bases: Minimal bibasilar  atelectasis.    Bones and soft tissues: No acute or suspicious osseous lesion.       Impression    IMPRESSION:   1. Nonspecific mild fat stranding in the right hemipelvis of unknown  clinical significance.  Normal appendix. Equivocal mild asymmetric  enlargement of the right ovary relative to the left.  Pelvic  ultrasound could be considered for further evaluation, particularly if  concerned for ovarian pathology.  2. No evidence of nephroureterolithiasis, bladder stone or  hydroureteronephrosis.  3. Colonic diverticulosis without convincing evidence of acute  diverticulitis.  4. Hysterectomy.  5. Small free fluid in the pelvis.    I have personally reviewed the examination and initial interpretation  and I agree with the findings.    SOPHIA MORRIS MD   Pelvis US, complete    Narrative    1. Heterogeneous left ovary with paraovarian fluid without definite  low resistive arterial flow. Ovarian torsion cannot be excluded.  1)  2. Normal right ovary.  3. Hysterectomy.       Assessment/Plan: Katherine Garcia is a 36 year old with suspected ovarian torsion.      The patient presented with waxing and weaning left abdominal pain episodes that worsened this afternoon. The differential diagnosis includes: pyelonephritis, nephrolithiasis, UTI, STI, ectopic pregnancy and ovarian torsion. The patient has had a hysterectomy, making ectopic pregnancy unlikely. STI is unlikely as wet prep was negative, although GC/Chlam needs to be collected. UA was reassuring and wbc was normal, making pyelonephritis and UTI unlikely. Nephrolithiasis work up with CT was reassuring making a kidney stone unlikely. Pelvic US was inconclusive and could not rule out ovarian torsion. Given exclusion of other potential diagnoses, the inconclusive US and her waxing and waning pain for nearly 24 hours, I suspect she may have a torsed ovary or at least a partial ovarian torsion. Discussed with the need for further observation and possible surgical  intervention.     Plan the patient will be transferred to the [facility] for further observation.       Pt discussed with Dr. Funez.     Barbara Velarde MD PhD  Ob/Gyn PGY-2  7/11/2017 3:06 AM      OB/GYN Staff -- Pt seen and examined by me separately from resident. Agree with note as above.  Nica

## 2017-07-11 NOTE — ED NOTES
Patient was signed out to me by DR. Timmons at 2:30 AM.  Please see their note for full details and history.  Patient has abdominal pain.  Ultrasound concerning for left ovarian torsion..  Plan at time of sign out is patient will be seen by gynecology..       Course in the ED:  Patient seen by gynecology.  Discussed with her staff, Dr. Funez.  Patient has had a hysterectomy so fertility is nonissue at this point.  Still some concern given the torsed ovary and worrisome for possible necrosis.  Patient be admitted to the gynecology service at [place].  Discussed with patient.      Deuce Matute MD  07/11/17 0255
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
## 2017-07-11 NOTE — PLAN OF CARE
Problem: Goal Outcome Summary  Goal: Goal Outcome Summary  Outcome: No Change  Pt arrived on unit around 4:30am via ambulance from [place] with spouse. Alert and oriented x4. Presented to unit with abdominal discomfort which she describes as constant, sharp, and stabbing. Lung sounds diminished. Bowel sounds hyperactive. Endorses generalized weakness, otherwise repositions independently. Up with standby assist. Denies numbness and tingling, headaches, SOB, nausea, and vomiting. Diet: NPO. She received PRN ID dilaudid 0.3mg and 600mg of PRN Ibuprofen. Skin is WDL. PIV infusing LR @125ml/hr. Able to make needs known and call light within reach. Continue plan of care.

## 2017-07-11 NOTE — ED NOTES
flank pain, beleived to be kindey stones, was x rayed at clinic today, pain since 0400 am. has had hysterectomy

## 2017-07-11 NOTE — PLAN OF CARE
Problem: Goal Outcome Summary  Goal: Goal Outcome Summary  Outcome: Improving  Afebrile. VSS.  At beginning of shift, patients pain localized to L groin area, tender to palpation.  Had emesis in AM and given SL Zofran with improvement.  Also given hot packs for comfort, IV Toradol and patient rested most of shift.  Started IV Zosyn and collected urine for UA/UC and checking for STIs.  NPO most of shift until MDs decided they would not pursue diagnostic lap since per their exam, patient's pain appears better and started on regular diet.  Ate about 25% of meal.  LR infusing at 125/hr.  Continue to monitor.

## 2017-07-11 NOTE — DISCHARGE SUMMARY
Gynecology Discharge Summary    Katherine Garcia    YOB: 1980  MRN# [id number]            Date of Admission:  7/10/2017  Date of Discharge:  07/12/17  Admitting Physician:  Rufina Funez MD  Discharge Physician:  Lisette Yoder MD  Discharging Service:  Gynecology     Primary Provider: Margret Gambino          Admission Diagnoses:   Left sided flank and abdominal pain of unknown etiology   Hx of cervicitis  Migraines  Cervical motion tenderness   Pelvic pain        Discharge Diagnosis:   Same as above  Suspected PID               Procedures:   none          Medications Prior to Admission:     Prescriptions Prior to Admission   Medication Sig Dispense Refill Last Dose     butalbital-acetaminophen-caffeine (FIORICET) -40 MG per tablet Take 1 tablet by mouth every 4 hours as needed 40 tablet 2 Past Week at Unknown time     nortriptyline (PAMELOR) 25 MG capsule Take 1-2 capsules (25-50 mg) by mouth At Bedtime (Patient not taking: Reported on 7/10/2017) 60 capsule 2 More than a month at Unknown time             Discharge Medications:     Current Discharge Medication List      START taking these medications    Details   doxycycline (VIBRAMYCIN) 100 MG capsule Take 1 capsule (100 mg) by mouth every 12 hours  Qty: 25 capsule, Refills: 0    Associated Diagnoses: Lower abdominal pain; PID (pelvic inflammatory disease)      ibuprofen (ADVIL/MOTRIN) 600 MG tablet Take 1 tablet (600 mg) by mouth every 6 hours as needed for moderate pain  Qty: 30 tablet, Refills: 0    Associated Diagnoses: PID (pelvic inflammatory disease)         CONTINUE these medications which have NOT CHANGED    Details   butalbital-acetaminophen-caffeine (FIORICET) -40 MG per tablet Take 1 tablet by mouth every 4 hours as needed  Qty: 40 tablet, Refills: 2    Associated Diagnoses: Migraine without status migrainosus, not intractable, unspecified migraine type      nortriptyline (PAMELOR) 25 MG capsule  Take 1-2 capsules (25-50 mg) by mouth At Bedtime  Qty: 60 capsule, Refills: 2    Associated Diagnoses: Shift work sleep disorder; Migraine without status migrainosus, not intractable, unspecified migraine type                   Consultations:   none            Brief History of Illness:   Katherine Garcia is a 36 year old  who presented to the ED with left sided flank and abdominal pain. She is accompanied by her partner who is at the bedside and supportive. She reports the pain started around 4AM yesterday while she was at work. The pain is located in her left lower abdomen and radiates up to her flank and down to her suprapubic region. She describes pain episodes in which the pain would intensify for a few minutes and then lessen. After work she went home. She took some ibuprofen for the pain without relief. She was able to sleep during the day. When she woke up in the afternoon, the pain episodes worsened and the turned into a constant pain. That is when she decided to go to the ED. She presented to the ED around 4PM. She reports nausea but no vomiting with the pain episodes. She has vaginal spotting at baseline but denies any increase in vaginal bleeding. Denies dysuria, hematuria or urinary urgency/frequency. She denies any history of STIs and her last intercourse was with her  about 1 week ago.            Hospital Course:   In ED patient had CT abd/pelv that showed nonspecific mild fat stranding right hemipelvis, normal appendix, enlargement of the right ovary relative to the left. Also showed small free fluid in the pelvis. Pelvic US was obtained that showed left ovary with paraovarian fluid without definite low resistive arterial flow. Ovarian torsion not excluded. WBC count normal 8.3 and afebrile. Wet prep negative, Gc/Ct were pending, UA was normal. UPT negative to rule out ectopic pregnancy. On physical exam patient had cervical motion tenderness and patient was started on zosyn for  possible PID. Zosyn was decided based on patient's preference of what worked in past. She was also started on doxycycline. Serial abdominal exams continued to improve. Pain was controlled on PO ibuprofen and tylenol. Zosyn was discontinued and patient was continued on doxycycline. Patients pain was absent on discharge. Patients vitals were stable and she was tolerating regular diet.            Discharge Instructions and Follow-Up:   Discharge diet: Regular   Discharge activity: Activity as tolerated   Discharge follow-up: Follow up with OB/GYN in 2 weeks   Other instructions: Call the clinic or return to the ED if you have any of the following:   - Temperature greater than 100.4F  - Pain not controlled by pain medications  - Uncontrolled nausea/vomiting  - Foul-smelling vaginal discharge  - Vaginal bleeding soaking 1 pad per hour for 2 hours in a row         Samia Colón MD PGY1  7/12/2017 7:00 AM  OB/GYN Resident      [facility] staff:  Appreciate note by Dr. Colón.  I have seen and examined the patient without the resident. I have reviewed, edited, and agree with the note.    My findings are: in daily note.      Lisette Yoder MD, FACOG  7/12/2017  9:08 AM

## 2017-07-11 NOTE — PROGRESS NOTES
Interval Progress Note Gynecology    RN report that patient arrived to the floor    S: Patient states that she continues to have constant left low abdominal pain.  It feels like sharp and stabbing pain.  Her pain was previously improved with pain medications in the ED.  She denies any nausea, vomiting, dysuria, hematuria, vaginal bleeding, or vaginal discharge.  She reports normal bowel function and had her last bowel movement yesterday.  She states that she has had issues with pelvic pain since her hysterectomy.  She reports that she has never had pain this bad before.  The pain started in her left upper back and was on and off.  Then it traveled down and wrapped around her abdomen and traveled towards her groin.  Now it is in her left lower quadrant.  The patient was on and off very severe and when it started it was 10 minutes apart.  Over time the time between the pain became less and less and now the pain is constant.  Patient reports that since her surgery she has had on and off vaginal bleeding.      Surgical History: Patient has a history of left salpingectomy, bilateral tubal ligation, and 1/14/16 DaVinci subtotal hysterectomy with bilateral salpingectomy of tubal remnants, left ovarian cystectomy, removal of 2 growths (dermoid)       O:   Patient Vitals for the past 24 hrs:   BP Temp Temp src Pulse Heart Rate Resp SpO2 Weight   07/11/17 0445 107/50 97.2  F (36.2  C) Oral - 55 16 98 % -   07/11/17 0400 105/62 - - - - - 95 % -   07/11/17 0330 106/65 - - - - - 96 % -   07/11/17 0300 111/68 - - - - - 99 % -   07/11/17 0230 105/60 - - - - - 100 % -   07/11/17 0200 109/65 - - - - - 100 % -   07/11/17 0130 114/76 - - - - - 100 % -   07/11/17 0100 101/57 - - - - - 100 % -   07/11/17 0045 - - - - - - 100 % -   07/11/17 0029 101/61 - - - - - - -   07/10/17 2330 102/64 - - - - - 100 % -   07/10/17 2300 93/59 - - - - - 99 % -   07/10/17 2236 - - - - - - 99 % -   07/10/17 2235 - - - - - - 99 % -   07/10/17 2234 104/62 -  - - - - - -   07/10/17 2130 105/51 - - - - - 99 % -   07/10/17 2100 105/61 - - - - - 97 % -   07/10/17 2030 108/65 - - - - - 97 % -   07/10/17 194 112/50 98.4  F (36.9  C) Oral 55 55 18 100 % 71.4 kg (157 lb 6.4 oz)       General: awake, alert, answering questions appropriately, appears mildly uncomfortable with movements around the bed  CV: regular rate and rhythm  Lung: clear to auscultation bilaterally   Abdomen: soft, tenderness to palpation in left lower quadrant, non-tender to deep palpation in RUQ, RLQ, and LUQ.    Extremities: bilateral lower extremities without edema or tenderness     A&P: Katherine Garcia is a 36 year old  who is admitted with left lower quadrant pain.    Left lower quadrant pain:   Wet prep negative, UA negative.  Ultrasound with findings that cannot rule out left ovarian torsion.  Patient's pain is present, but she denies any nausea or vomiting.  Her vitals have been WNL since admission to the ED.  On exam she does have significant left lower quadrant pain to palpation.  Unclear at this time if patient has ovarian torsion.  Explained to the patient that the only way to definitively diagnose ovarian torsion is with a diagnostic laparoscopy.  This would require a surgery and if it did appear to be torsed we would then detorse it.  She did not feel strongly at this time about proceeding with a surgery.  We discussed pain control with PO and IV pain medications and continued evaluation of her abdominal exam.  If pain continues or worsens, then plan for diagnostic laparoscopy.  If pain improves then plan for continued monitoring.     -Serial abdominal exam  -Type and screen, NPO, IV fluids   -GC/CT urine ordered    Plan of care discussed with Dr. Funez who is in agreement.    Annabelle Martinez MD  OBGYN PGY3

## 2017-07-11 NOTE — PROGRESS NOTES
[facility] Gyn Progress Note     S: Patient is feeling some pain this morning. Says it has improved after pain medications. Pain has migrated from LUQ/back to more lower quadrant pain. She has been tolerating clear liquids. Mild nausea last night, but no vomiting. She has been having BM and passing flatus. She denies any fever or chills. She is able to ambulate without difficulty, dizziness or lightheadedness. Voiding spontaneously without issues.     O:  Patient Vitals for the past 24 hrs:   BP Temp Temp src Pulse Heart Rate Resp SpO2 Weight   07/11/17 0445 107/50 97.2  F (36.2  C) Oral - 55 16 98 % -   07/11/17 0400 105/62 - - - - - 95 % -   07/11/17 0330 106/65 - - - - - 96 % -   07/11/17 0300 111/68 - - - - - 99 % -   07/11/17 0230 105/60 - - - - - 100 % -   07/11/17 0200 109/65 - - - - - 100 % -   07/11/17 0130 114/76 - - - - - 100 % -   07/11/17 0100 101/57 - - - - - 100 % -   07/11/17 0045 - - - - - - 100 % -   07/11/17 0029 101/61 - - - - - - -   07/10/17 2330 102/64 - - - - - 100 % -   07/10/17 2300 93/59 - - - - - 99 % -   07/10/17 2236 - - - - - - 99 % -   07/10/17 2235 - - - - - - 99 % -   07/10/17 2234 104/62 - - - - - - -   07/10/17 2130 105/51 - - - - - 99 % -   07/10/17 2100 105/61 - - - - - 97 % -   07/10/17 2030 108/65 - - - - - 97 % -   07/10/17 1942 112/50 98.4  F (36.9  C) Oral 55 55 18 100 % 71.4 kg (157 lb 6.4 oz)     Gen: awake, alert, cooperative, no apparent distress  Abdomen: No CVA tenderness. Soft, non distended, tender palpation in all quadrants, more in LLQ, midline voluntary guarding, no rebound tenderness, no masses palpated, no hepatosplenomegally  Extremeties: warm, well perfused, no edema    Results for orders placed or performed during the hospital encounter of 07/10/17 (from the past 24 hour(s))   CBC with platelets differential   Result Value Ref Range    WBC 8.3 4.0 - 11.0 10e9/L    RBC Count 5.11 3.8 - 5.2 10e12/L    Hemoglobin 13.6 11.7 - 15.7 g/dL    Hematocrit 41.5  35.0 - 47.0 %    MCV 81 78 - 100 fl    MCH 26.6 26.5 - 33.0 pg    MCHC 32.8 31.5 - 36.5 g/dL    RDW 14.9 10.0 - 15.0 %    Platelet Count 168 150 - 450 10e9/L    Diff Method Automated Method     % Neutrophils 70.2 %    % Lymphocytes 21.3 %    % Monocytes 5.8 %    % Eosinophils 2.4 %    % Basophils 0.2 %    % Immature Granulocytes 0.1 %    Nucleated RBCs 0 0 /100    Absolute Neutrophil 5.8 1.6 - 8.3 10e9/L    Absolute Lymphocytes 1.8 0.8 - 5.3 10e9/L    Absolute Monocytes 0.5 0.0 - 1.3 10e9/L    Absolute Eosinophils 0.2 0.0 - 0.7 10e9/L    Absolute Basophils 0.0 0.0 - 0.2 10e9/L    Abs Immature Granulocytes 0.0 0 - 0.4 10e9/L    Absolute Nucleated RBC 0.0    Basic metabolic panel   Result Value Ref Range    Sodium 141 133 - 144 mmol/L    Potassium 3.8 3.4 - 5.3 mmol/L    Chloride 110 (H) 94 - 109 mmol/L    Carbon Dioxide 25 20 - 32 mmol/L    Anion Gap 6 3 - 14 mmol/L    Glucose 109 (H) 70 - 99 mg/dL    Urea Nitrogen 11 7 - 30 mg/dL    Creatinine 0.89 0.52 - 1.04 mg/dL    GFR Estimate 72 >60 mL/min/1.7m2    GFR Estimate If Black 87 >60 mL/min/1.7m2    Calcium 9.1 8.5 - 10.1 mg/dL   CT Abdomen Pelvis w/o Contrast (stone protocol)    Narrative    EXAMINATION: CT abdomen and pelvis without contrast, 7/10/2017 9:47 PM    TECHNIQUE:  Helical CT images from the lung bases through the  symphysis pubis were obtained without IV contrast.    COMPARISON: Abdominal radiograph 7/10/2017    HISTORY: Abdominal or left flank pain, possible stone.    FINDINGS:    Abdomen and pelvis: Liver, gallbladder, adrenal glands, spleen,  pancreas unremarkable.  No nephroureterolithiasis, hydronephrosis or hydroureter. Urinary  bladder is partially distended.  No bladder stone. No perinephric  fatty stranding.     No bowel obstruction. Descending and sigmoid colonic diverticulosis.  Evaluation of colonic wall is somewhat limited due to lack of IV  contrast, although there is no pericolonic fatty stranding to suggest  acute diverticulitis. Appendix  is visualized and is normal in caliber  (series 5, image 224), without periappendiceal fatty stranding.     Uterus is surgically absent. Gas bubbles in the vaginal cuff. Ovaries  containing multiple small functional cysts. Slightly enlarged right  ovary compared to the left ovary. Minimal fatty stranding in the right  hemipelvis, predominantly surrounding the right ovary. Minimal fluid  in the pelvis, likely physiologic. No free air within the abdomen and  pelvis. No suspicious adenopathy within abdomen pelvis. Possible lymph  node versus epiploic appendage adjacent to the anterior ascending  colon (series 5, image 184).    Lung bases: Minimal bibasilar atelectasis.    Bones and soft tissues: No acute or suspicious osseous lesion.       Impression    IMPRESSION:   1. Nonspecific mild fat stranding in the right hemipelvis of unknown  clinical significance.  Normal appendix. Equivocal mild asymmetric  enlargement of the right ovary relative to the left.  Pelvic  ultrasound could be considered for further evaluation, particularly if  concerned for ovarian pathology.  2. No evidence of nephroureterolithiasis, bladder stone or  hydroureteronephrosis.  3. Colonic diverticulosis without convincing evidence of acute  diverticulitis.  4. Hysterectomy.  5. Small free fluid in the pelvis.    I have personally reviewed the examination and initial interpretation  and I agree with the findings.    SOPHIA MORRIS MD   Pelvis US, complete    Narrative    1. Heterogeneous left ovary with paraovarian fluid without definite  low resistive arterial flow. Ovarian torsion cannot be excluded.  1)  2. Normal right ovary.  3. Hysterectomy.       Assessment and Plan: Katherine Garcia is a 36 year old  who is admitted with left lower quadrant pain. Patient had supracervical hysterectomy. Physical exam not impressive for ovarian torsion. Differential includes ovarian torsion, cyst rupture, nephrolithiasis. Will continue to monitor  with serial abdominal exams.     Left lower quadrant pain:     - Wet prep negative, UA negative.    - Ultrasound with findings that cannot rule out left ovarian torsion.    - Serial abdominal exam  - Type and screen, NPO, IV fluids   - GC/CT urine ordered     Dispo: Serial abdominal exams, will keep patient NPO on IVF.     Plan of care discussed with Dr. Armin Colón MD  OB/GYN Resident G1  7/11/2017 6:59 AM    OB/GYN Staff -- Pt seen and examined by me. Agree with note as above.I did BME and pt has cervical motion tenderness.  ABd exam-- no rebound or guarding. No peritoneal signs. She denies new sexual contact or h/o PID GC/CT.  Repeat UA to look for RBC, UPT as has not been done.  NPO for now.  Re-evaluate in 4 hours.  Either to OR to evaluate for torsion or may have clinically improved from Zosyn. Diff DX: ovarian torsion vs PID. Will contact OR.  Nica

## 2017-07-11 NOTE — ED NOTES
ED to Floor Handoff      S:  Katherine Garcia is a 36 year old female who speaks English and lives with a spouse,  in a home  They arrived in the ED by car from clinic with a complaint of Flank Pain    Initial vitals were:   BP: 112/50  Pulse: 55  Heart Rate: 55  Temp: 98.4  F (36.9  C)  Resp: 18  Weight: 71.4 kg (157 lb 6.4 oz)  SpO2: 100 %  Allergies:   Allergies   Allergen Reactions     Contrast Dye Anaphylaxis     Prednisone Anaphylaxis     Tylenol W/Codeine [Acetaminophen-Codeine] Cramps   .  The meds given in the ED and their home medications are:   Current Facility-Administered Medications   Medication     HYDROmorphone (PF) (DILAUDID) injection 0.5 mg     Current Outpatient Prescriptions   Medication     butalbital-acetaminophen-caffeine (FIORICET) -40 MG per tablet     nortriptyline (PAMELOR) 25 MG capsule     Social demographics are   Social History     Social History     Marital status: Single     Spouse name: N/A     Number of children: N/A     Years of education: N/A     Social History Main Topics     Smoking status: Never Smoker     Smokeless tobacco: Never Used     Alcohol use No     Drug use: No     Sexual activity: Yes     Birth control/ protection: Female Surgical     Other Topics Concern     None     Social History Narrative       B:   The patient has been ill for 1 day(s) and during this time the symptoms have remained the same.  In the ED was diagnosed with   Final diagnoses:   Ovarian torsion    Infection/sepsis suspected:No Isolation type; No active isolations   A:   In the ED these meds were given:   Medications   HYDROmorphone (PF) (DILAUDID) injection 0.5 mg (not administered)   0.9% sodium chloride BOLUS (0 mLs Intravenous Stopped 7/10/17 2114)   0.9% sodium chloride BOLUS (0 mLs Intravenous Stopped 7/10/17 2318)   HYDROmorphone (DILAUDID) injection 1 mg (1 mg Intravenous Given 7/10/17 2026)   ketorolac (TORADOL) injection 15 mg (15 mg Intravenous Given 7/10/17 2053)     Drips  running?  No  Labs results   Labs Ordered and Resulted from Time of ED Arrival Up to the Time of Departure from the ED   BASIC METABOLIC PANEL - Abnormal; Notable for the following:        Result Value    Chloride 110 (*)     Glucose 109 (*)     All other components within normal limits   CBC WITH PLATELETS DIFFERENTIAL     Imaging Studies:   Recent Results (from the past 24 hour(s))   CT Abdomen Pelvis w/o Contrast (stone protocol)    Narrative    EXAMINATION: CT abdomen and pelvis without contrast, 7/10/2017 9:47 PM    TECHNIQUE:  Helical CT images from the lung bases through the  symphysis pubis were obtained without IV contrast.    COMPARISON: Abdominal radiograph 7/10/2017    HISTORY: Abdominal or left flank pain, possible stone.    FINDINGS:    Abdomen and pelvis: Liver, gallbladder, adrenal glands, spleen,  pancreas unremarkable.  No nephroureterolithiasis, hydronephrosis or hydroureter. Urinary  bladder is partially distended.  No bladder stone. No perinephric  fatty stranding.     No bowel obstruction. Descending and sigmoid colonic diverticulosis.  Evaluation of colonic wall is somewhat limited due to lack of IV  contrast, although there is no pericolonic fatty stranding to suggest  acute diverticulitis. Appendix is visualized and is normal in caliber  (series 5, image 224), without periappendiceal fatty stranding.     Uterus is surgically absent. Gas bubbles in the vaginal cuff. Ovaries  containing multiple small functional cysts. Slightly enlarged right  ovary compared to the left ovary. Minimal fatty stranding in the right  hemipelvis, predominantly surrounding the right ovary. Minimal fluid  in the pelvis, likely physiologic. No free air within the abdomen and  pelvis. No suspicious adenopathy within abdomen pelvis. Possible lymph  node versus epiploic appendage adjacent to the anterior ascending  colon (series 5, image 184).    Lung bases: Minimal bibasilar atelectasis.    Bones and soft tissues: No  acute or suspicious osseous lesion.       Impression    IMPRESSION:   1. Nonspecific mild fat stranding in the right hemipelvis of unknown  clinical significance.  Normal appendix. Equivocal mild asymmetric  enlargement of the right ovary relative to the left.  Pelvic  ultrasound could be considered for further evaluation, particularly if  concerned for ovarian pathology.  2. No evidence of nephroureterolithiasis, bladder stone or  hydroureteronephrosis.  3. Colonic diverticulosis without convincing evidence of acute  diverticulitis.  4. Hysterectomy.  5. Small free fluid in the pelvis.    I have personally reviewed the examination and initial interpretation  and I agree with the findings.    SOPHIA MORRIS MD   Pelvis US, complete    Narrative    1. Heterogeneous left ovary with paraovarian fluid without definite  low resistive arterial flow. Ovarian torsion cannot be excluded.  1)  2. Normal right ovary.  3. Hysterectomy.     Recent vital signs /68  Pulse 55  Temp 98.4  F (36.9  C) (Oral)  Resp 18  Wt 71.4 kg (157 lb 6.4 oz)  LMP  (Exact Date)  SpO2 99%  BMI 31.79 kg/m2  Cardiac Rhythm: ,      Abnormal labs/tests/findings requiring intervention:---  Pain control: fair  Nausea control: fair  R:   Transfer assistance needed: Independent  Family present during ED course? Yes   Family currently present? Yes  Pt needs tele? No  Code Status: Full Code  Tasks needing to be completed:None    Ashly Prado  [facility]-- [phone] [place] [facility]  [phone] [facility]

## 2017-07-11 NOTE — ED PROVIDER NOTES
"  History     Chief Complaint   Patient presents with     Flank Pain     HPI  Katherine Garcia is a 36-year-old previously healthy female who presents to the ER today for evaluation of left flank pain. 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 She states that the pain was mild and she decided to wait on it and sleep when she got home; however, upon waking this afternoon she noted the pain to be much worse, now ranking it \"past 10\" out of 10. She describes that the pain does radiate into the left abdomen/pelvis. She has noted no provoking or palliating factors. She denies a history of similar pain and reports that she has had no trauma or injury to this area of her body. She has seen no rashes associated. The patient denies abdominal pain beyond that which is localized to the left inguinal region, and she denies any vomiting but has had nausea. The patient was seen in urgent care prior to coming here where KUB x-ray was performed; although this imagining modality were determined to be negative by providers there, there still was concern for kidney stone. The patient has a history of hysterectomy for menorrhagia, no other abdominal surgeries. She does still have both ovaries and fallopian tubes. No history of ovarian disease.     Current Facility-Administered Medications   Medication     0.9% sodium chloride BOLUS     0.9% sodium chloride BOLUS     Current Outpatient Prescriptions   Medication     butalbital-acetaminophen-caffeine (FIORICET) -40 MG per tablet     nortriptyline (PAMELOR) 25 MG capsule     History reviewed. No pertinent past medical history.    Past Surgical History:   Procedure Laterality Date     HYSTERECTOMY, PAP NO LONGER INDICATED         No family history on file.    Social History   Substance Use Topics     Smoking status: Never Smoker     Smokeless tobacco: Never Used     " Alcohol use No        Allergies   Allergen Reactions     Contrast Dye Anaphylaxis     Prednisone Anaphylaxis     Tylenol W/Codeine [Acetaminophen-Codeine] Cramps     I have reviewed the Medications, Allergies, Past Medical and Surgical History, and Social History in the Epic system.    Review of Systems   Constitutional: Negative for appetite change, chills and fever.   Respiratory: Negative for cough and shortness of breath.    Cardiovascular: Negative for chest pain.   Gastrointestinal: Positive for nausea. Negative for abdominal pain, diarrhea and vomiting.   Genitourinary: Positive for flank pain (left) and pelvic pain (left). Negative for difficulty urinating, dysuria and hematuria.   Musculoskeletal: Negative for arthralgias and myalgias.   Neurological: Negative for headaches.   All other systems reviewed and are negative.      Physical Exam   BP: 112/50  Pulse: 55  Heart Rate: 55  Temp: 98.4  F (36.9  C)  Resp: 18  Weight: 71.4 kg (157 lb 6.4 oz)  SpO2: 100 %  Physical Exam   Constitutional: She is oriented to person, place, and time. She appears well-developed and well-nourished. She appears distressed.   HENT:   Head: Normocephalic and atraumatic.   Mouth/Throat: Oropharynx is clear and moist.   Eyes: Conjunctivae are normal. Pupils are equal, round, and reactive to light.   Neck: Normal range of motion.   Cardiovascular: Normal rate, regular rhythm and normal heart sounds.    No murmur heard.  Pulmonary/Chest: Effort normal. No respiratory distress. She has no wheezes. She has no rales.   Abdominal: Soft. She exhibits no distension and no mass. There is tenderness. There is no rebound and no guarding.   Musculoskeletal: Normal range of motion. She exhibits no edema or deformity.   Neurological: She is alert and oriented to person, place, and time. No cranial nerve deficit.   Skin: Skin is warm and dry. No rash noted. She is not diaphoretic. No erythema.   Psychiatric: She has a normal mood and affect. Her  behavior is normal.       ED Course     ED Course     8:14 PM  The patient was seen and examined by Bhanu Timmons MD, in Room 21.     Procedures          Critical Care time:  none         Labs Ordered and Resulted from Time of ED Arrival Up to the Time of Departure from the ED   BASIC METABOLIC PANEL - Abnormal; Notable for the following:        Result Value    Chloride 110 (*)     Glucose 109 (*)     All other components within normal limits   CBC WITH PLATELETS DIFFERENTIAL            Assessments & Plan (with Medical Decision Making)   36-year-old  female w/ history of abdominal hysterectomy for dysfunctional vaginal bleeding; arriving to the emergency department with approximately 14 hours of left flank and LLQ abd pain. Upon arrival, she is noted to be alert, afebrile and hemodynamically stable. She has no evidence of localized trauma to the left flank. She is having significant nausea with poor PO intake today. We have initiated IV for IV fluids and initiation of IV analgesia. She has no generalized abdominal pain, and I do not suspect this to be consistent with appendicitis, bowel obstruction or perforated hollow viscus. By her description of radiation of pain, I would agree that suspicion is most consistent with a renal stone although with abrupt onset, torsion is a possibility. She has never previously had this before, and I would initially obtain a CT noncontrast of the left flank to rule out other potential pathology. She will remain NPO until after CT scan has been completed.     CT reveals no sign of acute pathology.  I did receive a call by the Radiology resident with concern of abnormal appearing R ovary.  I discussed that pain was predominantly on the left.  This was communicated with the patient who would prefer U/S which is reasonable.  Otherwise she has remained hemodynamically stable and non toxic appearing but with persistent pain.  Laboratory studies reveal no significant leukocytosis,  anemia, or electrolyte disturbance.      Ultimately, U/S reveals no observable blood flow to the L ovary with concern of possible ovarian torsion.      There has been some delay in being able to get a specialty service to see the patient.  I did page the Gyn Onc team at the [facility]; we were able to get a hold of this service with a second page with recommendations to another service on the [facility].  Again we did hear back from this service who also stated they do not see patients in our ED.  At this time we have been unable to reach a service willing to see this patient in the ED.      At 0230 we did receive a call back with plan to see the patient in the ED.  This patient was signed over to my colleague at this time awaiting Gyn consultation.     This part of the medical record was transcribed by Raf Azevedo, Medical Scribe, from a dictation done by Renny Judge MD.      I have reviewed the nursing notes.    I have reviewed the findings, diagnosis, plan and need for follow up with the patient.    New Prescriptions    No medications on file       Final diagnoses:   Ovarian torsion     IRenny, am serving as a trained medical scribe to document services personally performed by Bhanu Timmons MD, based on the provider's statements to me.      IBhanu MD, was physically present and have reviewed and verified the accuracy of this note documented by Renny Judge.    7/10/2017   [facility], [place], EMERGENCY DEPARTMENT     Bhanu Timmons MD  07/11/17 [id number]

## 2017-07-11 NOTE — PROGRESS NOTES
[facility] Gyn Progress Note     S: Patient is feeling the same as she was this afternoon. Pain still located in suprapubic region. Improved with hot packs. She has been tolerating food and liquids with no nausea or vomiting. She has been ambulating without difficulty. Voiding spontaneously without issues. Denies any fever chills.     O:  Patient Vitals for the past 24 hrs:   BP Temp Temp src Pulse Heart Rate Resp SpO2 Weight   17 1602 98/53 98.8  F (37.1  C) Oral - 61 16 97 % -   17 1120 99/56 97.1  F (36.2  C) Oral - 47 16 97 % -   17 0709 102/48 96.4  F (35.8  C) Axillary 62 62 16 99 % -   17 0445 107/50 97.2  F (36.2  C) Oral - 55 16 98 % -   17 0400 105/62 - - - - - 95 % -   17 0330 106/65 - - - - - 96 % -   17 0300 111/68 - - - - - 99 % -   17 0230 105/60 - - - - - 100 % -   17 0200 109/65 - - - - - 100 % -   17 0130 114/76 - - - - - 100 % -   17 0100 101/57 - - - - - 100 % -   17 0045 - - - - - - 100 % -   17 0029 101/61 - - - - - - -   07/10/17 2330 102/64 - - - - - 100 % -   07/10/17 2300 93/59 - - - - - 99 % -   07/10/17 2236 - - - - - - 99 % -   07/10/17 2235 - - - - - - 99 % -   07/10/17 2234 104/62 - - - - - - -   07/10/17 2130 105/51 - - - - - 99 % -   07/10/17 2100 105/61 - - - - - 97 % -   07/10/17 2030 108/65 - - - - - 97 % -   07/10/17 1942 112/50 98.4  F (36.9  C) Oral 55 55 18 100 % 71.4 kg (157 lb 6.4 oz)     Gen: Patient was sleeping comfortably in bed    Abdomen: Soft, non distended, mildly tender to palpation in suprapubic region, no guarding, no rebound tenderness  Extremeties: warm, well perfused      Assessment and Plan: Katherine Garcia is a 36 year old  who is admitted with left lower quadrant pain. Patient had supracervical hysterectomy in 2016. Physical exam with cervical motion tenderness, likely PID. Patient desires zosyn for antibiotics. Says in past this is the only thing that helps  her cervicitis. Serial abdominal exams improved throughout the day, patient in minimal pain. Patient only requiring toradol     - Gc/Ct pending   - Serial abdominal exam are improving   - Advanced diet to regular  -  IV fluid  - D#1 zosyn  - Start doxycycline 100 mg BID for 14 days due to know coverage of chlamydia with zosyn.      Dispo: Serial abdominal exams     Samia Colón MD  OB/GYN Resident G1  7/11/2017 5:45 PM

## 2017-07-12 ENCOUNTER — CARE COORDINATION (OUTPATIENT)
Dept: CARE COORDINATION | Facility: CLINIC | Age: 37
End: 2017-07-12

## 2017-07-12 VITALS
OXYGEN SATURATION: 97 % | HEART RATE: 55 BPM | WEIGHT: 157.4 LBS | TEMPERATURE: 97.3 F | SYSTOLIC BLOOD PRESSURE: 93 MMHG | RESPIRATION RATE: 14 BRPM | BODY MASS INDEX: 31.79 KG/M2 | DIASTOLIC BLOOD PRESSURE: 46 MMHG

## 2017-07-12 LAB
C TRACH DNA SPEC QL NAA+PROBE: NORMAL
N GONORRHOEA DNA SPEC QL NAA+PROBE: NORMAL
SPECIMEN SOURCE: NORMAL
SPECIMEN SOURCE: NORMAL

## 2017-07-12 PROCEDURE — 25000128 H RX IP 250 OP 636: Performed by: OBSTETRICS & GYNECOLOGY

## 2017-07-12 PROCEDURE — 25000132 ZZH RX MED GY IP 250 OP 250 PS 637: Performed by: STUDENT IN AN ORGANIZED HEALTH CARE EDUCATION/TRAINING PROGRAM

## 2017-07-12 PROCEDURE — 25000132 ZZH RX MED GY IP 250 OP 250 PS 637: Performed by: OBSTETRICS & GYNECOLOGY

## 2017-07-12 RX ORDER — DOXYCYCLINE 100 MG/1
100 CAPSULE ORAL EVERY 12 HOURS
Qty: 25 CAPSULE | Refills: 0 | Status: SHIPPED | OUTPATIENT
Start: 2017-07-12 | End: 2020-10-21

## 2017-07-12 RX ORDER — IBUPROFEN 600 MG/1
600 TABLET, FILM COATED ORAL EVERY 6 HOURS PRN
Qty: 30 TABLET | Refills: 0 | Status: SHIPPED | OUTPATIENT
Start: 2017-07-12

## 2017-07-12 RX ADMIN — ACETAMINOPHEN 650 MG: 325 TABLET, FILM COATED ORAL at 10:54

## 2017-07-12 RX ADMIN — KETOROLAC TROMETHAMINE 15 MG: 30 INJECTION, SOLUTION INTRAMUSCULAR at 08:22

## 2017-07-12 RX ADMIN — PIPERACILLIN SODIUM,TAZOBACTAM SODIUM 3.38 G: 3; .375 INJECTION, POWDER, FOR SOLUTION INTRAVENOUS at 01:00

## 2017-07-12 RX ADMIN — DOXYCYCLINE HYCLATE 100 MG: 100 CAPSULE ORAL at 10:54

## 2017-07-12 RX ADMIN — SENNOSIDES AND DOCUSATE SODIUM 2 TABLET: 8.6; 5 TABLET ORAL at 10:54

## 2017-07-12 RX ADMIN — SODIUM CHLORIDE, POTASSIUM CHLORIDE, SODIUM LACTATE AND CALCIUM CHLORIDE: 600; 310; 30; 20 INJECTION, SOLUTION INTRAVENOUS at 02:11

## 2017-07-12 RX ADMIN — KETOROLAC TROMETHAMINE 15 MG: 30 INJECTION, SOLUTION INTRAMUSCULAR at 02:11

## 2017-07-12 NOTE — PLAN OF CARE
Problem: Goal Outcome Summary  Goal: Goal Outcome Summary  Outcome: No Change  Pt is A&O. VSS and WNL. Sleeping for most of evening but continues to c/o constant, throbbing pain to LLQ of abd. Heat packs provided for comfort. Reported little relief from PRN tylenol and scheduled toradol. OB on call contacted and ordered 1 x dose atarax which was effective. Denies chest pain or SOB. Lungs CTA but diminished. Tolerating regular diet with no n/v. PIV infusing LR @ 125/hr. Has call light in reach and is able to make needs known. Continue with plan of care.     At 2245, pt reported noting edema to RUE fingers and tingling to arm. No other numbness/tingling or neuro changes. IV fluids stopped and PIV saline locked. RUE elevated and warm pack applied. Will f/u with oncoming nurse about having new PIV started on LUE for fluids and 0100 abx. Continue to monitor.

## 2017-07-12 NOTE — LETTER
Health Care Home - Access Care Plan    About Me  Patient Name:  Katherine Garcia    YOB: 1980  Age:                            36 year old   Ana MRN:         [id number] Telephone Information:     Home Phone [phone]   Mobile [phone]       Address:    [address] [facility] [address] Email address:  [email or web address]      Emergency Contact(s)  Name Relationship Lgl Grd Work Phone Home Phone Mobile Phone   1. REBECCA CARDONA Mother   [phone]    2. YESSICA SYED Spouse    [phone]             Health Maintenance: Routine Health maintenance Reviewed: Due/Overdue ***    My Access Plan  Medical Emergency 911   Questions or concerns during clinic hours Primary Clinic Line, I will call the clinic directly: Primary Clinic: [facility]- [phone]   24 Hour Appointment Line [phone] or  [phone])  (toll free)   24 Hour Nurse Line [phone] (toll free)   Questions or concerns outside clinic hours 24 Hour Appointment Line, I will call the after-hours on-call line:   {Clinics - Healthcare Home:216845}   Preferred Urgent Care     Preferred Hospital     Preferred Pharmacy [facility]     Behavioral Health Crisis Line [facility], [phone] or 911     My Care Team Members  Patient Care Team       Relationship Specialty Notifications Start End    Margret Gambino MD PCP - General Family Practice  3/15/17     Phone: [phone] Fax: [phone]         [address] [address]        My Medical and Care Information  Problem List   Patient Active Problem List   Diagnosis     Rash     Migraine without status migrainosus, not intractable, unspecified migraine type     CARDIOVASCULAR SCREENING; LDL GOAL LESS THAN 160     Shift work sleep disorder     Lower abdominal pain     Pelvic pain in female     Cervical motion tenderness     H/O urinary  tract infection     PTSD (post-traumatic stress disorder)     PTSD (post-traumatic stress disorder)      Current Medications and Allergies:  See printed Medication Report

## 2017-07-12 NOTE — PROGRESS NOTES
Clinic Care Coordination Contact  [facility]/Voicemail    Referral Source: CTS  Clinical Data: Care Coordinator Outreach  Outreach attempted x 1.  Left message on voicemail with call back information and requested return call.  Plan: Care Coordinator will mail out care coordination introduction letter with care coordinator contact information and explanation of care coordination services. Care Coordinator will try to reach patient again in 1-2 business days.      Carey Sumner, RN BSN, PHN RN Care Coordinator  [facility]  [email or web address]  [phone]  7/12/2017 4:31 PM

## 2017-07-12 NOTE — PROGRESS NOTES
[facility] Gyn Progress Note     S: Overnight patient continued to have throbbing in LLQ of abdomen. Used heat packs for comfort. Pain is controlled on PO meds. No nausea no vomiting. She is tolerating PO intake. Ambulating with no difficulty, dizziness or lightheadedness. Voiding spontaneously without issues.     O:  Patient Vitals for the past 24 hrs:   BP Temp Temp src Pulse Heart Rate Resp SpO2   17 2250 101/54 - - - 53 - -   17 2247 (!) 101/38 98.5  F (36.9  C) Oral - 62 18 97 %   17 1602 98/53 98.8  F (37.1  C) Oral - 61 16 97 %   17 1120 99/56 97.1  F (36.2  C) Oral - 47 16 97 %   17 0709 102/48 96.4  F (35.8  C) Axillary 62 62 16 99 %     Gen: awake, alert, cooperative, no apparent distress  Abdomen: Soft, non distended, non tender, no guarding, no rebound tenderness   Extremeties: warm, well perfused, mild edema in right hand-very minimal       Assessment and Plan:   Katherine Gacria is a 36 year old  who is admitted with left lower quadrant pain. Patient had supracervical hysterectomy in 2016. Physical exam with cervical motion tenderness, likely PID vs ovarian torsion vs nephrolithiasis (normal UA)      - Gc/Ct pending   - Patient tolerating regular diet   - Ambulating without difficulty  - Continue doxycycline 100 mg BID D#2 of 14. Will discontinue zosyn.   - Scheduled Ibuprofen for pain 600 mg Q6H, tylenol prn    Dispo: discharge home today     [facility] Gynecology patient.  To reach the RESIDENT PHYSICIAN responsible for this patient, use the following pagers:  -DAY (630 AM- 6 PM), page [phone]  -NIGHT (6PM-6:30 AM), page [phone]   -WEEKEND (FRI 6PM to SUN 6PM), page [phone]    Samia Colón MD  OB/GYN Resident G1  2017 6:18 AM    [facility] staff:  Appreciate note by Dr. Colón.  I have seen and examined the patient without the resident. I have reviewed, edited, and agree with the note.    My findings are:   Vitals:     07/11/17 1602 07/11/17 2247 07/11/17 2250 07/12/17 0715   BP: 98/53 (!) 101/38 101/54 93/46   BP Location: Left arm Left arm Right arm Left arm   Pulse:    55   Resp: 16 18  14   Temp: 98.8  F (37.1  C) 98.5  F (36.9  C)  97.3  F (36.3  C)   TempSrc: Oral Oral  Oral   SpO2: 97% 97%  97%   Weight:         ABD: soft, NT, ND.     OK to d/c home w/ oral abx and GYN f/u with primary care MD.     Lisette Yoder MD, FACOG  7/12/2017  9:06 AM

## 2017-07-12 NOTE — PLAN OF CARE
Problem: Goal Outcome Summary  Goal: Goal Outcome Summary  Outcome: No Change  Pt resting in bed. No report of significant pain or discomfort. No nausea or emesis. IV abx given as ordered. Reports that discomfort to left arm has resolved. IVF infusing again at 125cc/hr as ordered. Able to make needs known. Cont to assess.

## 2017-07-12 NOTE — PROVIDER NOTIFICATION
OB resident of pain not controlled with PRN tylenol or scheduled toradol. Providing order for PRN vistaril.

## 2017-07-12 NOTE — PLAN OF CARE
Problem: Goal Outcome Summary  Goal: Goal Outcome Summary  Reports abdominal pain improved today compared to yesterday.  Tolerable using Toradol and Tylenol.  Tolerating regular diet. Voiding spontaneously. Medically stable for discharge.  IV removed.  Given discharge medications and discharge instructions. Discharged with  at 1130 via w/c.

## 2017-07-12 NOTE — LETTER
[facility]   [address] [place] [address]  [place] [address]  [phone]    July 12, 2017    Katherine Garcia  [facility] [facility] [address]    Dear Katherine,  I am the Clinic Care Coordinator that works with your primary care provider's clinic. I wanted to introduce myself and provide you with my contact information for you to be able to call me with any questions or concerns. Below is a description of what Clinic Care Coordination is and how I can further assist you.     The Clinic Care Coordinator role is a Registered Nurse and/or  who understands the health care system. The goal of Clinic Care Coordination is to help you manage your health and improve access to the [facility] system in the most efficient manner.  The Registered Nurse can assist you in meeting your health care goals by providing education, coordinating services, and strengthening the communication among your providers. The  can assist you with financial, behavioral, psychosocial, and chemical dependency and counseling/psychiatric resources.    Please feel free to keep this letter and contact information to contact me at [phone] with any further questions or concerns that may arise. We at [facility] are focused on providing you with the highest-quality healthcare experience possible and that all starts with you.       Sincerely,     Sherri Sumner    Enclosed: I have enclosed a copy of a 24 Hour Access Plan. This has helpful phone numbers for you to call when needed. Please keep this in an easy to access place to use as needed.

## 2017-07-13 NOTE — PROGRESS NOTES
Clinic Care Coordination Contact  [facility]/Voicemail    Referral Source: CTS  Clinical Data: Care Coordinator Outreach  Outreach attempted x 2.  Left message on voicemail with call back information and requested return call.  Plan: Care Coordinator mailed out care coordination introduction letter on 7/12/17. Care Coordinator will try to reach patient again in 7-10 business days.  If no response will close to CC    Carey Sumner RN BSN, PHN RN Care Coordinator  [facility]  [email or web address]  [phone]  7/13/2017 9:19 AM

## 2017-10-30 DIAGNOSIS — G43.909 MIGRAINE WITHOUT STATUS MIGRAINOSUS, NOT INTRACTABLE, UNSPECIFIED MIGRAINE TYPE: ICD-10-CM

## 2017-10-30 NOTE — TELEPHONE ENCOUNTER
butalbital-acetaminophen-caffeine (FIORICET) -40 MG per tablet      Last Written Prescription Date:  3/15/17  Last Fill Quantity: 40,   # refills: 2  Future Office visit:       Routing refill request to provider for review/approval because:  Drug not on the [facility], P or [facility] refill protocol or controlled substance

## 2017-10-31 RX ORDER — BUTALBITAL, ACETAMINOPHEN AND CAFFEINE 50; 325; 40 MG/1; MG/1; MG/1
1 TABLET ORAL EVERY 4 HOURS PRN
Qty: 30 TABLET | Refills: 0 | Status: SHIPPED | OUTPATIENT
Start: 2017-10-31 | End: 2020-10-13

## 2020-10-13 ENCOUNTER — VIRTUAL VISIT (OUTPATIENT)
Dept: FAMILY MEDICINE | Facility: CLINIC | Age: 40
End: 2020-10-13
Payer: COMMERCIAL

## 2020-10-13 DIAGNOSIS — G47.26 SHIFT WORK SLEEP DISORDER: ICD-10-CM

## 2020-10-13 DIAGNOSIS — G43.909 MIGRAINE WITHOUT STATUS MIGRAINOSUS, NOT INTRACTABLE, UNSPECIFIED MIGRAINE TYPE: Primary | ICD-10-CM

## 2020-10-13 PROCEDURE — 99203 OFFICE O/P NEW LOW 30 MIN: CPT | Mod: 95 | Performed by: FAMILY MEDICINE

## 2020-10-13 RX ORDER — BUTALBITAL, ACETAMINOPHEN AND CAFFEINE 50; 325; 40 MG/1; MG/1; MG/1
1 TABLET ORAL EVERY 4 HOURS PRN
Qty: 30 TABLET | Refills: 2 | Status: SHIPPED | OUTPATIENT
Start: 2020-10-13 | End: 2020-10-21

## 2020-10-13 RX ORDER — MODAFINIL 200 MG/1
200 TABLET ORAL DAILY
Qty: 30 TABLET | Refills: 2 | Status: SHIPPED | OUTPATIENT
Start: 2020-10-13 | End: 2020-10-21

## 2020-10-13 RX ORDER — BUTALBITAL, ACETAMINOPHEN AND CAFFEINE 50; 325; 40 MG/1; MG/1; MG/1
1 TABLET ORAL EVERY 4 HOURS PRN
Qty: 30 TABLET | Refills: 2 | Status: SHIPPED | OUTPATIENT
Start: 2020-10-13 | End: 2020-10-13

## 2020-10-13 NOTE — PROGRESS NOTES
"Katherine Garcia is a 39 year old female who is being evaluated via a billable telephone visit.      The patient has been notified of following:     \"This telephone visit will be conducted via a call between you and your physician/provider. We have found that certain health care needs can be provided without the need for a physical exam.  This service lets us provide the care you need with a short phone conversation.  If a prescription is necessary we can send it directly to your pharmacy.  If lab work is needed we can place an order for that and you can then stop by our lab to have the test done at a later time.    Telephone visits are billed at different rates depending on your insurance coverage. During this emergency period, for some insurers they may be billed the same as an in-person visit.  Please reach out to your insurance provider with any questions.    If during the course of the call the physician/provider feels a telephone visit is not appropriate, you will not be charged for this service.\"    Patient has given verbal consent for Telephone visit?  Yes    What phone number would you like to be contacted at? [phone]    How would you like to obtain your AVS? Mail a copy    Subjective     Katherine Garcia is a 39 year old female who presents via phone visit today for the following health issues:    HPI     Would like to discuss refills of Fioricet.    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     Phone visit with patient today in follow-up of migraines.  This is an issue known to me though I have not seen patient in a few years.  Has had intermittent flareups of migraines that may disappear for months at a time.  More active recently and she has run out of her Fioricet which is worked quite well in the past.  No particular side effects.  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  In the " past we tried some nortriptyline to help her sleep during the daytime in hopes that it helps with migraine as well.  The falling asleep is not the issue, it has more to do with being drowsy especially while driving.  Body cannot seem to accommodate to the overnight schedule.  We discussed medications that could potentially help with this.    Review of Systems   Constitutional, HEENT, cardiovascular, pulmonary, gi and gu systems are negative, except as otherwise noted.       Objective          Vitals:  No vitals were obtained today due to virtual visit.    healthy, alert and no distress  PSYCH: Alert and oriented times 3; coherent speech, normal   rate and volume, able to articulate logical thoughts, able   to abstract reason, no tangential thoughts, no hallucinations   or delusions  Her affect is normal  RESP: No cough, no audible wheezing, able to talk in full sentences  Remainder of exam unable to be completed due to telephone visits    Past labs reviewed with the patient.         Assessment/Plan:    Assessment & Plan     Migraine without status migrainosus, not intractable, unspecified migraine type  Stable on current regimen.  Continue same plan and routine follow-up.   - butalbital-acetaminophen-caffeine (FIORICET) -40 MG tablet; Take 1 tablet by mouth every 4 hours as needed for migraine    Shift work sleep disorder  Discussed mechanism of action of the proposed medication, as well as potential effects, both good and bad.  Patient expressed understanding and agreed with treatment.   - modafinil (PROVIGIL) 200 MG tablet; Take 1 tablet (200 mg) by mouth daily For excessive drowsiness        See Patient Instructions    No follow-ups on file.    Margret Gambino MD  [facility]    Phone call duration:  13 minutes

## 2020-10-14 NOTE — PROGRESS NOTES
I have tried to send twice and it is not going through.  Can we call it in please?  I cannot fax from home.

## 2020-10-16 ENCOUNTER — TELEPHONE (OUTPATIENT)
Dept: FAMILY MEDICINE | Facility: CLINIC | Age: 40
End: 2020-10-16

## 2020-10-16 NOTE — TELEPHONE ENCOUNTER
Patient took a dose on Tuesday and Wednesday developed facial rash and puffiness around the eye.    Symptoms went away on its own after stopped taking it.    Looking for an alternative medication to help with sleep.    Telephone visit scheduled with Dr. Gambino on Wednesday.    Christine Brito RN

## 2020-10-16 NOTE — TELEPHONE ENCOUNTER
Reason for Call:  Other call back    Detailed comments: Patient calling in she states she began the  modafinil (PROVIGIL) 200 MG tablet took 1 dose  and got a rash on her cheeks,took 2nd dose and right eye became swollen. Patient states this is the same reaction she gets when she is allergic to something so she would like to speak with dr sandhu to see if there is an alternative medication she can take. Patient declined triage       Phone Number Patient can be reached at: Cell number on file:    Telephone Information:   Mobile [phone]       Best Time: [place]    Can we leave a detailed message on this number? YES    Call taken on 10/16/2020 at 10:23 AM by Linh Sandoval

## 2020-10-21 ENCOUNTER — VIRTUAL VISIT (OUTPATIENT)
Dept: FAMILY MEDICINE | Facility: CLINIC | Age: 40
End: 2020-10-21
Payer: COMMERCIAL

## 2020-10-21 DIAGNOSIS — G47.26 SHIFT WORK SLEEP DISORDER: Primary | ICD-10-CM

## 2020-10-21 DIAGNOSIS — G43.909 MIGRAINE WITHOUT STATUS MIGRAINOSUS, NOT INTRACTABLE, UNSPECIFIED MIGRAINE TYPE: ICD-10-CM

## 2020-10-21 PROCEDURE — 99213 OFFICE O/P EST LOW 20 MIN: CPT | Mod: 95 | Performed by: FAMILY MEDICINE

## 2020-10-21 RX ORDER — BUTALBITAL, ACETAMINOPHEN AND CAFFEINE 50; 325; 40 MG/1; MG/1; MG/1
1 TABLET ORAL EVERY 4 HOURS PRN
Qty: 30 TABLET | Refills: 2 | Status: SHIPPED | OUTPATIENT
Start: 2020-10-21 | End: 2021-07-07

## 2020-10-21 RX ORDER — METHYLPHENIDATE HYDROCHLORIDE 27 MG/1
27 TABLET ORAL DAILY
Qty: 30 TABLET | Refills: 0 | Status: SHIPPED | OUTPATIENT
Start: 2020-10-21 | End: 2020-11-09

## 2020-10-21 NOTE — PROGRESS NOTES
Called prescription into the [facility] pharmacy as transmission continued to fail.        Benjamin Rinaldi RN, BSN, PHN

## 2020-10-21 NOTE — Clinical Note
For some reason the prescription for Fioricet will not electronically transmit even though I sent it multiple times.  Can this be called in?  Otherwise we could print it and I can fax it on Friday

## 2020-10-21 NOTE — PROGRESS NOTES
"Katherine Garcia is a 39 year old female who is being evaluated via a billable telephone visit.      The patient has been notified of following:     \"This telephone visit will be conducted via a call between you and your physician/provider. We have found that certain health care needs can be provided without the need for a physical exam.  This service lets us provide the care you need with a short phone conversation.  If a prescription is necessary we can send it directly to your pharmacy.  If lab work is needed we can place an order for that and you can then stop by our lab to have the test done at a later time.    Telephone visits are billed at different rates depending on your insurance coverage. During this emergency period, for some insurers they may be billed the same as an in-person visit.  Please reach out to your insurance provider with any questions.    If during the course of the call the physician/provider feels a telephone visit is not appropriate, you will not be charged for this service.\"    Patient has given verbal consent for Telephone visit?  Yes    What phone number would you like to be contacted at? cell    How would you like to obtain your AVS? Mail a copy    Subjective     Katherine Garcia is a 39 year old female who presents via phone visit today for the following health issues:    HPI     Spoke with patient via phone in follow-up of shiftwork sleep disorder.  Talked to her a couple of weeks ago and I sent in a prescription for Provigil.  Unfortunately she developed hives and facial swelling after a couple of dosages.  Took about 3 days to get better and she is back to normal.  Did not have any breathing difficulties.  But she does have some anaphylactic history to other medications and was familiar with this.    Review of Systems   Constitutional, HEENT, cardiovascular, pulmonary, gi and gu systems are negative, except as otherwise noted.       Objective          Vitals:  No " vitals were obtained today due to virtual visit.    healthy, alert and no distress  PSYCH: Alert and oriented times 3; coherent speech, normal   rate and volume, able to articulate logical thoughts, able   to abstract reason, no tangential thoughts, no hallucinations   or delusions  Her affect is normal  RESP: No cough, no audible wheezing, able to talk in full sentences  Remainder of exam unable to be completed due to telephone visits    Past labs reviewed with the patient.         Assessment/Plan:    Assessment & Plan     Shift work sleep disorder  Allergic reaction to Provigil and I would be hesitant to try Nuvigil.  So we discussed other options and might be left with trying a stimulant.  I also see her daughter and treat her for ADHD.  Patient herself does not necessarily endorse that symptomatology but she is at least familiar with the medication.  Discussed mechanism of action of the proposed medication, as well as potential effects, both good and bad.  Patient expressed understanding and agreed with treatment.  Contact me in 1 to 2 weeks with progress  - methylphenidate (CONCERTA) 27 MG CR tablet; Take 1 tablet (27 mg) by mouth daily (Drowsiness)    Migraine without status migrainosus, not intractable, unspecified migraine type  This prescription did not go through to the pharmacy and we will try to get  - butalbital-acetaminophen-caffeine (FIORICET) -40 MG tablet; Take 1 tablet by mouth every 4 hours as needed for migraine        See Patient Instructions    No follow-ups on file.    Margret Gambino MD  [facility]    Phone call duration:  7 minutes

## 2020-10-29 ENCOUNTER — TELEPHONE (OUTPATIENT)
Dept: FAMILY MEDICINE | Facility: CLINIC | Age: 40
End: 2020-10-29

## 2021-01-15 ENCOUNTER — HEALTH MAINTENANCE LETTER (OUTPATIENT)
Age: 41
End: 2021-01-15

## 2021-02-25 DIAGNOSIS — G47.26 SHIFT WORK SLEEP DISORDER: ICD-10-CM

## 2021-02-25 RX ORDER — METHYLPHENIDATE HYDROCHLORIDE 54 MG/1
54 TABLET ORAL EVERY MORNING
Qty: 30 TABLET | Refills: 0 | Status: SHIPPED | OUTPATIENT
Start: 2021-02-25 | End: 2021-04-06

## 2021-02-25 NOTE — TELEPHONE ENCOUNTER
Routing refill request to provider for review/approval because:  Drug not on the [facility] refill protocol     Kiley MARTINEZN, RN

## 2021-04-05 DIAGNOSIS — G47.26 SHIFT WORK SLEEP DISORDER: ICD-10-CM

## 2021-04-06 RX ORDER — METHYLPHENIDATE HYDROCHLORIDE 54 MG/1
54 TABLET ORAL EVERY MORNING
Qty: 30 TABLET | Refills: 0 | Status: SHIPPED | OUTPATIENT
Start: 2021-04-06 | End: 2021-04-23

## 2021-04-23 ENCOUNTER — OFFICE VISIT (OUTPATIENT)
Dept: FAMILY MEDICINE | Facility: CLINIC | Age: 41
End: 2021-04-23
Payer: COMMERCIAL

## 2021-04-23 VITALS
WEIGHT: 172 LBS | RESPIRATION RATE: 16 BRPM | HEIGHT: 59 IN | TEMPERATURE: 97.3 F | HEART RATE: 84 BPM | DIASTOLIC BLOOD PRESSURE: 70 MMHG | OXYGEN SATURATION: 99 % | BODY MASS INDEX: 34.68 KG/M2 | SYSTOLIC BLOOD PRESSURE: 112 MMHG

## 2021-04-23 DIAGNOSIS — Z01.812 ENCOUNTER FOR PREOPERATIVE SCREENING LABORATORY TESTING FOR COVID-19 VIRUS: ICD-10-CM

## 2021-04-23 DIAGNOSIS — Z01.818 PREOP GENERAL PHYSICAL EXAM: Primary | ICD-10-CM

## 2021-04-23 DIAGNOSIS — H72.91 PERFORATION OF RIGHT TYMPANIC MEMBRANE: ICD-10-CM

## 2021-04-23 DIAGNOSIS — G47.26 SHIFT WORK SLEEP DISORDER: ICD-10-CM

## 2021-04-23 DIAGNOSIS — Z11.52 ENCOUNTER FOR PREOPERATIVE SCREENING LABORATORY TESTING FOR COVID-19 VIRUS: ICD-10-CM

## 2021-04-23 PROBLEM — Z87.440 H/O URINARY TRACT INFECTION: Status: RESOLVED | Noted: 2017-07-11 | Resolved: 2021-04-23

## 2021-04-23 PROBLEM — N94.9 CERVICAL MOTION TENDERNESS: Status: RESOLVED | Noted: 2017-07-11 | Resolved: 2021-04-23

## 2021-04-23 PROBLEM — R10.30 LOWER ABDOMINAL PAIN: Status: RESOLVED | Noted: 2017-07-11 | Resolved: 2021-04-23

## 2021-04-23 PROCEDURE — 99214 OFFICE O/P EST MOD 30 MIN: CPT | Performed by: FAMILY MEDICINE

## 2021-04-23 RX ORDER — METHYLPHENIDATE HYDROCHLORIDE 54 MG/1
54 TABLET ORAL EVERY MORNING
Qty: 30 TABLET | Refills: 0 | Status: SHIPPED | OUTPATIENT
Start: 2021-06-23 | End: 2021-09-13

## 2021-04-23 RX ORDER — METHYLPHENIDATE HYDROCHLORIDE 54 MG/1
54 TABLET ORAL EVERY MORNING
Qty: 30 TABLET | Refills: 0 | Status: SHIPPED | OUTPATIENT
Start: 2021-05-23 | End: 2021-12-03

## 2021-04-23 RX ORDER — METHYLPHENIDATE HYDROCHLORIDE 54 MG/1
54 TABLET ORAL EVERY MORNING
Qty: 30 TABLET | Refills: 0 | Status: SHIPPED | OUTPATIENT
Start: 2021-04-23 | End: 2021-10-11

## 2021-04-23 RX ORDER — ZOLPIDEM TARTRATE 10 MG/1
5-10 TABLET ORAL
Qty: 30 TABLET | Refills: 0 | Status: SHIPPED | OUTPATIENT
Start: 2021-04-23 | End: 2021-06-20

## 2021-04-23 ASSESSMENT — MIFFLIN-ST. JEOR: SCORE: 1364.78

## 2021-04-23 NOTE — PROGRESS NOTES
[address] [address]  Phone: [phone]  Primary Provider: Stephanie Gambino  Pre-op Performing Provider: STEPHANIE GAMBINO      PREOPERATIVE EVALUATION:  Today's date: 4/23/2021    Katherine Garcia is a 40 year old female who presents for a preoperative evaluation.    Surgical Information:  Surgery/Procedure: RIGHT C02 LASER CARTILAGE GRAFT, TYMPANOPLASTY WITH POSSIBLE TYMPANOMASTOIDECTOMY AND OSSICULOPLASTY  Surgery Location: [facility]  Surgeon: Dr. Braden Denson  Surgery Date: 4/30/2021  Time of Surgery: TBD  Where patient plans to recover: At home with family  Fax number for surgical facility: [phone]    Type of Anesthesia Anticipated: to be determined    Assessment & Plan     The proposed surgical procedure is considered INTERMEDIATE risk.    Preop general physical exam    Perforation of right tympanic membrane       Risks and Recommendations:  The patient has the following additional risks and recommendations for perioperative complications:   - No identified additional risk factors other than previously addressed    Medication Instructions:  Patient is to take all scheduled medications on the day of surgery EXCEPT for modifications listed below:   - ibuprofen (Advil, Motrin): HOLD 1 day before surgery.     RECOMMENDATION:  APPROVAL GIVEN to proceed with proposed procedure, without further diagnostic evaluation.      Subjective     HPI related to upcoming procedure:   Patient blew her nose in November 2020 and felt pain in her right ear.  Barotrauma caused a rupture of the tympanic membrane.  Unfortunately has not healed here.    Preop Questions 4/21/2021   1. Have you ever had a heart attack or stroke? No   2. Have you ever had surgery on your heart or blood vessels, such as a stent placement, a coronary artery bypass, or surgery on an artery in your head, neck, heart, or legs? No   3. Do you have chest  pain with activity? No   4. Do you have a history of  heart failure? No   5. Do you currently have a cold, bronchitis or symptoms of other infection? No   6. Do you have a cough, shortness of breath, or wheezing? No   7. Do you or anyone in your family have previous history of blood clots? No   8. Do you or does anyone in your family have a serious bleeding problem such as prolonged bleeding following surgeries or cuts? No   9. Have you ever had problems with anemia or been told to take iron pills? No   10. Have you had any abnormal blood loss such as black, tarry or bloody stools, or abnormal vaginal bleeding? No   11. Have you ever had a blood transfusion? No   12. Are you willing to have a blood transfusion if it is medically needed before, during, or after your surgery? Yes   13. Have you or any of your relatives ever had problems with anesthesia? No   14. Do you have sleep apnea, excessive snoring or daytime drowsiness? No   15. Do you have any artifical heart valves or other implanted medical devices like a pacemaker, defibrillator, or continuous glucose monitor? No   16. Do you have artificial joints? No   17. Are you allergic to latex? No   18. Is there any chance that you may be pregnant? No       Health Care Directive:  Patient does not have a Health Care Directive or Living Will:     Preoperative Review of :   reviewed - no record of controlled substances prescribed.      Status of Chronic Conditions:  See problem list for active medical problems.  Problems all longstanding and stable, except as noted/documented.  See ROS for pertinent symptoms related to these conditions.      Review of Systems  CONSTITUTIONAL: NEGATIVE for fever, chills, change in weight  INTEGUMENTARY/SKIN: NEGATIVE for worrisome rashes, moles or lesions  EYES: NEGATIVE for vision changes or irritation  ENT/MOUTH: Right ear as above  RESP: NEGATIVE for significant cough or SOB  BREAST: NEGATIVE for masses, tenderness or  discharge  CV: NEGATIVE for chest pain, palpitations or peripheral edema  GI: NEGATIVE for nausea, abdominal pain, heartburn, or change in bowel habits  : NEGATIVE for frequency, dysuria, or hematuria  MUSCULOSKELETAL: NEGATIVE for significant arthralgias or myalgia  NEURO: NEGATIVE for weakness, dizziness or paresthesias  ENDOCRINE: NEGATIVE for temperature intolerance, skin/hair changes  HEME: NEGATIVE for bleeding problems  PSYCHIATRIC: NEGATIVE for changes in mood or affect    Patient Active Problem List    Diagnosis Date Noted     Pelvic pain in female 07/11/2017     Priority: Medium     PTSD (post-traumatic stress disorder)      Priority: Medium     Migraine without status migrainosus, not intractable, unspecified migraine type 03/15/2017     Priority: Medium     CARDIOVASCULAR SCREENING; LDL GOAL LESS THAN 160 03/15/2017     Priority: Medium     Shift work sleep disorder 03/15/2017     Priority: Medium     Rash 01/13/2013     Priority: Medium      Past Medical History:   Diagnosis Date     Anxiety      MDD (major depressive disorder)      Migraines      PTSD (post-traumatic stress disorder)      Past Surgical History:   Procedure Laterality Date     DILATION AND CURETTAGE       DILATION AND CURETTAGE       HYSTERECTOMY, SUPRACERVICAL LAPAROSCOPIC  01/14/2016    with right salpingectomy for menorrhagia w/ irregular cycle, complicated by postoperative bleeding for several weeks, fever, UTI     HYSTEROSCOPY DIAGNOSTIC      polypectomy     Laparoscopic salpingectomy Left      [place] teeth extraction       Current Outpatient Medications   Medication Sig Dispense Refill     butalbital-acetaminophen-caffeine (FIORICET) -40 MG tablet Take 1 tablet by mouth every 4 hours as needed for migraine 30 tablet 2     ibuprofen (ADVIL/MOTRIN) 600 MG tablet Take 1 tablet (600 mg) by mouth every 6 hours as needed for moderate pain 30 tablet 0     methylphenidate (CONCERTA) 54 MG CR tablet Take 1 tablet (54 mg) by mouth  "every morning Needs follow up visit for any further refill. 30 tablet 0       Allergies   Allergen Reactions     Contrast Dye Anaphylaxis     Prednisone Anaphylaxis     Provigil [Modafinil] Hives     Tylenol W/Codeine [Acetaminophen-Codeine] Cramps        Social History     Tobacco Use     Smoking status: Never Smoker     Smokeless tobacco: Never Used   Substance Use Topics     Alcohol use: No     History reviewed. No pertinent family history.  History   Drug Use No         Objective     /70   Pulse 84   Temp 97.3  F (36.3  C) (Tympanic)   Resp 16   Ht 1.505 m (4' 11.25\")   Wt 78 kg (172 lb)   LMP 01/06/2013   SpO2 99%   BMI 34.45 kg/m      Physical Exam    GENERAL APPEARANCE: healthy, alert and no distress     EYES: EOMI, PERRL     HENT: ear canals and TM's normal and nose and mouth without ulcers or lesions     NECK: no adenopathy, no asymmetry, masses, or scars and thyroid normal to palpation     RESP: lungs clear to auscultation - no rales, rhonchi or wheezes     CV: regular rates and rhythm, normal S1 S2, no S3 or S4 and no murmur, click or rub     ABDOMEN:  soft, nontender, no HSM or masses and bowel sounds normal     MS: extremities normal- no gross deformities noted, no evidence of inflammation in joints, FROM in all extremities.     SKIN: no suspicious lesions or rashes     NEURO: Normal strength and tone, sensory exam grossly normal, mentation intact and speech normal     PSYCH: mentation appears normal. and affect normal/bright     LYMPHATICS: No cervical adenopathy    No results for input(s): HGB, PLT, INR, NA, POTASSIUM, CR, A1C in the last 56018 hours.     Diagnostics:  No lab work or EKG needed      Revised Cardiac Risk Index (RCRI):  The patient has the following serious cardiovascular risks for perioperative complications:   - No serious cardiac risks = 0 points     RCRI Interpretation: 0 points: Class I (very low risk - 0.4% complication rate)           Signed Electronically by: Margret " Braden Gambino MD  Copy of this evaluation report is provided to requesting physician.

## 2021-04-23 NOTE — PATIENT INSTRUCTIONS

## 2021-04-27 DIAGNOSIS — Z11.52 ENCOUNTER FOR PREOPERATIVE SCREENING LABORATORY TESTING FOR COVID-19 VIRUS: ICD-10-CM

## 2021-04-27 DIAGNOSIS — Z01.812 ENCOUNTER FOR PREOPERATIVE SCREENING LABORATORY TESTING FOR COVID-19 VIRUS: ICD-10-CM

## 2021-04-27 LAB
LABORATORY COMMENT REPORT: NORMAL
SARS-COV-2 RNA RESP QL NAA+PROBE: NEGATIVE
SARS-COV-2 RNA RESP QL NAA+PROBE: NORMAL
SPECIMEN SOURCE: NORMAL
SPECIMEN SOURCE: NORMAL

## 2021-04-27 PROCEDURE — U0003 INFECTIOUS AGENT DETECTION BY NUCLEIC ACID (DNA OR RNA); SEVERE ACUTE RESPIRATORY SYNDROME CORONAVIRUS 2 (SARS-COV-2) (CORONAVIRUS DISEASE [COVID-19]), AMPLIFIED PROBE TECHNIQUE, MAKING USE OF HIGH THROUGHPUT TECHNOLOGIES AS DESCRIBED BY CMS-2020-01-R: HCPCS | Performed by: FAMILY MEDICINE

## 2021-04-27 PROCEDURE — U0005 INFEC AGEN DETEC AMPLI PROBE: HCPCS | Performed by: FAMILY MEDICINE

## 2021-05-07 ENCOUNTER — TRANSFERRED RECORDS (OUTPATIENT)
Dept: HEALTH INFORMATION MANAGEMENT | Facility: CLINIC | Age: 41
End: 2021-05-07

## 2021-06-16 ENCOUNTER — TELEPHONE (OUTPATIENT)
Dept: FAMILY MEDICINE | Facility: CLINIC | Age: 41
End: 2021-06-16

## 2021-06-16 DIAGNOSIS — G47.26 SHIFT WORK SLEEP DISORDER: ICD-10-CM

## 2021-06-20 RX ORDER — ZOLPIDEM TARTRATE 10 MG/1
TABLET ORAL
Qty: 30 TABLET | Refills: 0 | Status: SHIPPED | OUTPATIENT
Start: 2021-06-20 | End: 2021-08-09

## 2021-06-23 ENCOUNTER — TRANSFERRED RECORDS (OUTPATIENT)
Dept: HEALTH INFORMATION MANAGEMENT | Facility: CLINIC | Age: 41
End: 2021-06-23

## 2021-07-07 DIAGNOSIS — G43.909 MIGRAINE WITHOUT STATUS MIGRAINOSUS, NOT INTRACTABLE, UNSPECIFIED MIGRAINE TYPE: ICD-10-CM

## 2021-07-07 RX ORDER — BUTALBITAL, ACETAMINOPHEN AND CAFFEINE 50; 325; 40 MG/1; MG/1; MG/1
TABLET ORAL
Qty: 30 TABLET | Refills: 2 | Status: SHIPPED | OUTPATIENT
Start: 2021-07-07 | End: 2021-07-07

## 2021-07-07 RX ORDER — BUTALBITAL, ACETAMINOPHEN AND CAFFEINE 50; 325; 40 MG/1; MG/1; MG/1
TABLET ORAL
Qty: 30 TABLET | Refills: 2 | Status: SHIPPED | OUTPATIENT
Start: 2021-07-07 | End: 2022-07-19

## 2021-07-07 NOTE — TELEPHONE ENCOUNTER
Routing refill request to provider for review/approval because:  Drug not on the [facility] refill protocol.     Akua Kim RN  [facility]

## 2021-07-07 NOTE — TELEPHONE ENCOUNTER
E-Prescribing Status: Transmission to pharmacy failed (7/7/2021 10:40 AM CDT)    Please resend    Pradeep Ellis RN  [facility]

## 2021-08-09 DIAGNOSIS — G47.26 SHIFT WORK SLEEP DISORDER: ICD-10-CM

## 2021-08-09 RX ORDER — ZOLPIDEM TARTRATE 10 MG/1
TABLET ORAL
Qty: 30 TABLET | Refills: 0 | Status: SHIPPED | OUTPATIENT
Start: 2021-08-09 | End: 2021-09-13

## 2021-08-09 NOTE — TELEPHONE ENCOUNTER
Routing refill request to provider for review/approval because:  Drug not on the [facility] refill protocol   Lucretia Kaur RN, BSN   [facility]

## 2021-09-04 ENCOUNTER — HEALTH MAINTENANCE LETTER (OUTPATIENT)
Age: 41
End: 2021-09-04

## 2021-09-13 DIAGNOSIS — G47.26 SHIFT WORK SLEEP DISORDER: ICD-10-CM

## 2021-09-13 RX ORDER — ZOLPIDEM TARTRATE 10 MG/1
TABLET ORAL
Qty: 30 TABLET | Refills: 0 | Status: SHIPPED | OUTPATIENT
Start: 2021-09-13 | End: 2021-10-11

## 2021-09-13 RX ORDER — METHYLPHENIDATE HYDROCHLORIDE 54 MG/1
TABLET ORAL
Qty: 30 TABLET | Refills: 0 | Status: SHIPPED | OUTPATIENT
Start: 2021-09-13 | End: 2021-10-11

## 2021-09-13 NOTE — TELEPHONE ENCOUNTER
Routing refill request to provider for review/approval because:  Drug not on the [facility] refill protocol   Lucretia Kaur RN, BSN   [facility]

## 2021-10-11 DIAGNOSIS — G47.26 SHIFT WORK SLEEP DISORDER: ICD-10-CM

## 2021-10-11 RX ORDER — METHYLPHENIDATE HYDROCHLORIDE 54 MG/1
54 TABLET ORAL EVERY MORNING
Qty: 30 TABLET | Refills: 0 | Status: SHIPPED | OUTPATIENT
Start: 2021-10-11 | End: 2021-12-03

## 2021-10-11 RX ORDER — ZOLPIDEM TARTRATE 10 MG/1
TABLET ORAL
Qty: 30 TABLET | Refills: 0 | Status: SHIPPED | OUTPATIENT
Start: 2021-10-11 | End: 2021-12-03

## 2021-11-18 NOTE — TELEPHONE ENCOUNTER
Patient informed by phone of provider's message.  Deanna Driscoll MA    
Reason for Call:  Other prescription    Detailed comments: Regarding methylphenidate (CONCERTA) 27 MG CR tablet,  Asking if the dose can be increased to 54mg.  Please call to advise.  Thank you     Phone Number Patient can be reached at: Home number on file [phone] (home)    Best Time: Any     Can we leave a detailed message on this number? YES    Call taken on 10/29/2020 at 2:10 PM by Howard Mora      
Yes, have her start taking 2 tabs daily to test for a week or so.  Insurance will not cover a refill for another 10 days or so.    Have her sign up for [employer] so she can do e-visits or send messages easily   
[Negative] : Heme/Lymph

## 2021-12-03 ENCOUNTER — VIRTUAL VISIT (OUTPATIENT)
Dept: FAMILY MEDICINE | Facility: CLINIC | Age: 41
End: 2021-12-03
Payer: COMMERCIAL

## 2021-12-03 DIAGNOSIS — G47.26 SHIFT WORK SLEEP DISORDER: Primary | ICD-10-CM

## 2021-12-03 PROCEDURE — 99213 OFFICE O/P EST LOW 20 MIN: CPT | Mod: 95 | Performed by: FAMILY MEDICINE

## 2021-12-03 RX ORDER — ZOLPIDEM TARTRATE 5 MG/1
5-10 TABLET ORAL
Qty: 60 TABLET | Refills: 2 | Status: SHIPPED | OUTPATIENT
Start: 2021-12-03 | End: 2022-04-01

## 2021-12-03 RX ORDER — METHYLPHENIDATE HYDROCHLORIDE 54 MG/1
54 TABLET ORAL EVERY MORNING
Qty: 30 TABLET | Refills: 0 | Status: SHIPPED | OUTPATIENT
Start: 2022-02-03 | End: 2022-04-01

## 2021-12-03 RX ORDER — METHYLPHENIDATE HYDROCHLORIDE 54 MG/1
54 TABLET ORAL EVERY MORNING
Qty: 30 TABLET | Refills: 0 | Status: SHIPPED | OUTPATIENT
Start: 2021-12-03 | End: 2023-01-09

## 2021-12-03 RX ORDER — METHYLPHENIDATE HYDROCHLORIDE 54 MG/1
54 TABLET ORAL EVERY MORNING
Qty: 30 TABLET | Refills: 0 | Status: SHIPPED | OUTPATIENT
Start: 2022-01-03 | End: 2023-01-09

## 2021-12-03 NOTE — PROGRESS NOTES
"Katherine is a 40 year old who is being evaluated via a billable video visit.      How would you like to obtain your AVS? MyChart  If the video visit is dropped, the invitation should be resent by: Text to cell phone: 1  Will anyone else be joining your video visit? No      Video Start Time: 0815    Assessment & Plan     Shift work sleep disorder  Stable on current regimen.  Continue same plan and routine follow-up.   Cutting her Ambien tablets is difficult so we will go ahead and try to get the 5 mg covered but still have it available to take 1 to 2 tablets.  - methylphenidate (CONCERTA) 54 MG CR tablet; Take 1 tablet (54 mg) by mouth every morning  - methylphenidate (CONCERTA) 54 MG CR tablet; Take 1 tablet (54 mg) by mouth every morning  - methylphenidate (CONCERTA) 54 MG CR tablet; Take 1 tablet (54 mg) by mouth every morning  - zolpidem (AMBIEN) 5 MG tablet; Take 1-2 tablets (5-10 mg) by mouth nightly as needed for sleep       BMI:   Estimated body mass index is 34.45 kg/m  as calculated from the following:    Height as of 4/23/21: 1.505 m (4' 11.25\").    Weight as of 4/23/21: 78 kg (172 lb).   Weight management plan: Discussed healthy diet and exercise guidelines    See Patient Instructions    Return in about 6 months (around 6/3/2022) for Follow up of Chronic Issues, In Office or Video, can call for refills.    Margret Gambino MD  [facility]   Katherine is a 40 year old who presents for the following health issues     HPI     Visit with patient today in follow-up of shiftwork sleep disorder. We have used some Ambien to help with sleeping and Concerta to help with alertness and the combination has worked well with no particular side effects.  Doing well.  Reports no interval health concerns.      Review of Systems   Constitutional, HEENT, cardiovascular, pulmonary, gi and gu systems are negative, except as otherwise noted.      Objective           Vitals:  No vitals " were obtained today due to virtual visit.    Physical Exam   GENERAL: Healthy, alert and no distress  EYES: Eyes grossly normal to inspection.  No discharge or erythema, or obvious scleral/conjunctival abnormalities.  RESP: No audible wheeze, cough, or visible cyanosis.  No visible retractions or increased work of breathing.    SKIN: Visible skin clear. No significant rash, abnormal pigmentation or lesions.  NEURO: Cranial nerves grossly intact.  Mentation and speech appropriate for age.  PSYCH: Mentation appears normal, affect normal/bright, judgement and insight intact, normal speech and appearance well-groomed.    Past labs reviewed with the patient.             Video-Visit Details    Type of service:  Video Visit    Video End Time:0818    Originating Location (pt. Location): Home    Distant Location (provider location):  [facility]     Platform used for Video Visit: [employer]

## 2022-02-19 ENCOUNTER — HEALTH MAINTENANCE LETTER (OUTPATIENT)
Age: 42
End: 2022-02-19

## 2022-04-01 DIAGNOSIS — G47.26 SHIFT WORK SLEEP DISORDER: ICD-10-CM

## 2022-04-01 RX ORDER — ZOLPIDEM TARTRATE 5 MG/1
TABLET ORAL
Qty: 60 TABLET | Refills: 0 | Status: SHIPPED | OUTPATIENT
Start: 2022-04-01 | End: 2022-07-19

## 2022-04-01 RX ORDER — METHYLPHENIDATE HYDROCHLORIDE 54 MG/1
TABLET ORAL
Qty: 30 TABLET | Refills: 0 | Status: SHIPPED | OUTPATIENT
Start: 2022-04-01 | End: 2022-07-21

## 2022-04-01 NOTE — TELEPHONE ENCOUNTER
Routing refill request to provider for review/approval because:  Drug/ supplies not on the [facility] refill protocol     Kiley Carpio BSN, RN

## 2022-04-01 NOTE — TELEPHONE ENCOUNTER
[facility]  Last filled zolpidem 2/25/22  Last filled concerta 2/25/22  Last visit 12/23/21  Medications refilled

## 2022-07-18 DIAGNOSIS — G43.909 MIGRAINE WITHOUT STATUS MIGRAINOSUS, NOT INTRACTABLE, UNSPECIFIED MIGRAINE TYPE: ICD-10-CM

## 2022-07-18 DIAGNOSIS — G47.26 SHIFT WORK SLEEP DISORDER: ICD-10-CM

## 2022-07-19 RX ORDER — BUTALBITAL, ACETAMINOPHEN AND CAFFEINE 50; 325; 40 MG/1; MG/1; MG/1
TABLET ORAL
Qty: 30 TABLET | Refills: 0 | Status: SHIPPED | OUTPATIENT
Start: 2022-07-19 | End: 2023-04-05

## 2022-07-19 RX ORDER — ZOLPIDEM TARTRATE 5 MG/1
TABLET ORAL
Qty: 60 TABLET | Refills: 0 | Status: SHIPPED | OUTPATIENT
Start: 2022-07-19 | End: 2023-01-09

## 2022-07-21 ENCOUNTER — TELEPHONE (OUTPATIENT)
Dept: FAMILY MEDICINE | Facility: CLINIC | Age: 42
End: 2022-07-21

## 2022-07-21 DIAGNOSIS — G47.26 SHIFT WORK SLEEP DISORDER: ICD-10-CM

## 2022-07-21 RX ORDER — METHYLPHENIDATE HYDROCHLORIDE 54 MG/1
TABLET ORAL
Qty: 30 TABLET | Refills: 0 | Status: SHIPPED | OUTPATIENT
Start: 2022-07-21 | End: 2023-01-09

## 2022-07-21 NOTE — TELEPHONE ENCOUNTER
07/21/2022 12:28 PM Phone (Incoming) [facility] Pharmacist  [phone] Remove   [facility]      By Moira Corrales, RN           [facility] pharmacist received a denial on this her methylphenidate.  I informed him that looking in the chart I do not see a denial. I see where this was forwarded to Dr. Gambino for review. He will await his response.  Thank you. Moira Corrales R.N.

## 2022-07-21 NOTE — TELEPHONE ENCOUNTER
Provider:  Are you willing to refill the requested medication.  Thank you. Moira Corrales R.N.           07/21/2022 12:28 PM Phone (Incoming) [facility] Pharmacist  [phone] [facility]      By Moira Corrales, RN           [facility] pharmacist received a denial on this her methylphenidate.  I informed him that looking in the chart I do not see a denial. I see where this was forwarded to Dr. Gambino for review. He will await his response.  Thank you. Moira Corrales R.N.

## 2022-07-21 NOTE — TELEPHONE ENCOUNTER
Routing refill request to provider for review/approval because:  Drug not on the [facility] refill protocol     Joann Stuart RN  [facility]

## 2022-10-22 ENCOUNTER — HEALTH MAINTENANCE LETTER (OUTPATIENT)
Age: 42
End: 2022-10-22

## 2022-10-28 DIAGNOSIS — G47.26 SHIFT WORK SLEEP DISORDER: ICD-10-CM

## 2022-10-29 RX ORDER — METHYLPHENIDATE HYDROCHLORIDE 54 MG/1
TABLET ORAL
Qty: 30 TABLET | Refills: 0 | OUTPATIENT
Start: 2022-10-29

## 2022-10-29 RX ORDER — ZOLPIDEM TARTRATE 5 MG/1
TABLET ORAL
Qty: 60 TABLET | Refills: 0 | OUTPATIENT
Start: 2022-10-29

## 2022-10-29 NOTE — TELEPHONE ENCOUNTER
Refill denied to the pharmacy.   If patient schedules an appointment we can provide a adams refill.

## 2023-01-09 ENCOUNTER — VIRTUAL VISIT (OUTPATIENT)
Dept: FAMILY MEDICINE | Facility: CLINIC | Age: 43
End: 2023-01-09
Payer: COMMERCIAL

## 2023-01-09 DIAGNOSIS — G47.26 SHIFT WORK SLEEP DISORDER: Primary | ICD-10-CM

## 2023-01-09 PROCEDURE — 99213 OFFICE O/P EST LOW 20 MIN: CPT | Mod: 95 | Performed by: FAMILY MEDICINE

## 2023-01-09 RX ORDER — METHYLPHENIDATE HYDROCHLORIDE 54 MG/1
54 TABLET ORAL EVERY MORNING
Qty: 30 TABLET | Refills: 0 | Status: SHIPPED | OUTPATIENT
Start: 2023-03-09

## 2023-01-09 RX ORDER — METHYLPHENIDATE HYDROCHLORIDE 54 MG/1
54 TABLET ORAL EVERY MORNING
Qty: 30 TABLET | Refills: 0 | Status: SHIPPED | OUTPATIENT
Start: 2023-02-09 | End: 2023-08-25

## 2023-01-09 RX ORDER — METHYLPHENIDATE HYDROCHLORIDE 54 MG/1
54 TABLET ORAL EVERY MORNING
Qty: 30 TABLET | Refills: 0 | Status: SHIPPED | OUTPATIENT
Start: 2023-01-09 | End: 2023-08-25

## 2023-01-09 RX ORDER — ZOLPIDEM TARTRATE 5 MG/1
5-10 TABLET ORAL
Qty: 60 TABLET | Refills: 5 | Status: SHIPPED | OUTPATIENT
Start: 2023-01-09 | End: 2023-08-25

## 2023-01-09 NOTE — PROGRESS NOTES
Katherine is a 42 year old who is being evaluated via a billable video visit.      How would you like to obtain your AVS? MyChart  If the video visit is dropped, the invitation should be resent by: Text to cell phone: [phone]  Will anyone else be joining your video visit? No          Assessment & Plan     Shift work sleep disorder  Stable on current regimen.  Continue same plan and routine follow-up.  Plan follow-up 6 months  - methylphenidate (CONCERTA) 54 MG CR tablet; Take 1 tablet (54 mg) by mouth every morning  - zolpidem (AMBIEN) 5 MG tablet; Take 1-2 tablets (5-10 mg) by mouth nightly as needed for sleep  - methylphenidate (CONCERTA) 54 MG CR tablet; Take 1 tablet (54 mg) by mouth every morning  - methylphenidate (CONCERTA) 54 MG CR tablet; Take 1 tablet (54 mg) by mouth every morning         See Patient Instructions    Return in about 6 months (around 7/9/2023) for follow up on this issue, In Office or Video, can call for refills.    Margret Gambino MD  [facility]   Katherine is a 42 year old, presenting for the following health issues:  Sleep Problem      History of Present Illness       Reason for visit:  Refill sleep disorder medications    She eats 4 or more servings of fruits and vegetables daily.She consumes 1 sweetened beverage(s) daily.She exercises with enough effort to increase her heart rate 30 to 60 minutes per day.  She exercises with enough effort to increase her heart rate 5 days per week.   She is taking medications regularly.       Video visit patient today to follow-up on medication for sleep and daytime fatigue.  Her regimen has worked quite well and she desires no change.  Recently tested positive for COVID but just mild cold symptoms currently and generally doing okay.    Review of Systems   Constitutional, HEENT, cardiovascular, pulmonary, gi and gu systems are negative, except as otherwise noted.      Objective    Vitals - Patient  Reported  Pain Score: No Pain (0)      Vitals:  No vitals were obtained today due to virtual visit.    Physical Exam   GENERAL: Healthy, alert and no distress  EYES: Eyes grossly normal to inspection.  No discharge or erythema, or obvious scleral/conjunctival abnormalities.  RESP: No audible wheeze, cough, or visible cyanosis.  No visible retractions or increased work of breathing.    SKIN: Visible skin clear. No significant rash, abnormal pigmentation or lesions.  NEURO: Cranial nerves grossly intact.  Mentation and speech appropriate for age.  PSYCH: Mentation appears normal, affect normal/bright, judgement and insight intact, normal speech and appearance well-groomed.    Past labs reviewed with the patient.             Video-Visit Details    Type of service:  Video Visit     Originating Location (pt. Location): Home    Distant Location (provider location):  Off-site  Platform used for Video Visit: [employer]

## 2023-04-01 ENCOUNTER — HEALTH MAINTENANCE LETTER (OUTPATIENT)
Age: 43
End: 2023-04-01

## 2023-04-05 DIAGNOSIS — G43.909 MIGRAINE WITHOUT STATUS MIGRAINOSUS, NOT INTRACTABLE, UNSPECIFIED MIGRAINE TYPE: ICD-10-CM

## 2023-04-05 RX ORDER — BUTALBITAL, ACETAMINOPHEN AND CAFFEINE 50; 325; 40 MG/1; MG/1; MG/1
TABLET ORAL
Qty: 30 TABLET | Refills: 0 | Status: SHIPPED | OUTPATIENT
Start: 2023-04-05 | End: 2023-08-25

## 2023-08-25 ENCOUNTER — VIRTUAL VISIT (OUTPATIENT)
Dept: FAMILY MEDICINE | Facility: CLINIC | Age: 43
End: 2023-08-25
Payer: COMMERCIAL

## 2023-08-25 DIAGNOSIS — G43.909 MIGRAINE WITHOUT STATUS MIGRAINOSUS, NOT INTRACTABLE, UNSPECIFIED MIGRAINE TYPE: ICD-10-CM

## 2023-08-25 DIAGNOSIS — G47.26 SHIFT WORK SLEEP DISORDER: Primary | ICD-10-CM

## 2023-08-25 PROCEDURE — 99213 OFFICE O/P EST LOW 20 MIN: CPT | Mod: VID | Performed by: FAMILY MEDICINE

## 2023-08-25 RX ORDER — METHYLPHENIDATE HYDROCHLORIDE 54 MG/1
54 TABLET ORAL DAILY
Qty: 30 TABLET | Refills: 0 | Status: SHIPPED | OUTPATIENT
Start: 2023-08-25 | End: 2024-01-17

## 2023-08-25 RX ORDER — ZOLPIDEM TARTRATE 5 MG/1
5-10 TABLET ORAL
Qty: 60 TABLET | Refills: 5 | Status: SHIPPED | OUTPATIENT
Start: 2023-08-25 | End: 2024-01-30

## 2023-08-25 RX ORDER — METHYLPHENIDATE HYDROCHLORIDE 54 MG/1
54 TABLET ORAL DAILY
Qty: 30 TABLET | Refills: 0 | Status: SHIPPED | OUTPATIENT
Start: 2023-10-26 | End: 2023-11-25

## 2023-08-25 RX ORDER — METHYLPHENIDATE HYDROCHLORIDE 54 MG/1
54 TABLET ORAL DAILY
Qty: 30 TABLET | Refills: 0 | Status: SHIPPED | OUTPATIENT
Start: 2023-09-25 | End: 2023-10-25

## 2023-08-25 RX ORDER — BUTALBITAL, ACETAMINOPHEN AND CAFFEINE 50; 325; 40 MG/1; MG/1; MG/1
1 TABLET ORAL EVERY 4 HOURS PRN
Qty: 30 TABLET | Refills: 0 | Status: SHIPPED | OUTPATIENT
Start: 2023-08-25 | End: 2023-11-15

## 2023-08-25 NOTE — PROGRESS NOTES
Katherine is a 42 year old who is being evaluated via a billable video visit.      How would you like to obtain your AVS? MyChart  If the video visit is dropped, the invitation should be resent by: Text to cell phone: [phone]  Will anyone else be joining your video visit? No          Assessment & Plan     Shift work sleep disorder  Stable on current regimen.  Continue same plan and routine follow-up.   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  Things seem to be stable.  - butalbital-acetaminophen-caffeine (ESGIC) -40 MG tablet; Take 1 tablet by mouth every 4 hours as needed for headaches       See Patient Instructions    Margret Gambino MD  [facility]   Katherine is a 42 year old, presenting for the following health issues:  Refill Request        8/25/2023     8:42 AM   Additional Questions   Roomed by Nidia       History of Present Illness       Reason for visit:  Medication Refill    She eats 2-3 servings of fruits and vegetables daily.She consumes 1 sweetened beverage(s) daily.She exercises with enough effort to increase her heart rate 30 to 60 minutes per day.  She exercises with enough effort to increase her heart rate 3 or less days per week.   She is taking medications regularly.         Video visit with patient today to follow-up on sleep, etc.      Review of Systems   Constitutional, HEENT, cardiovascular, pulmonary,  gi and gu systems are negative, except as otherwise noted.      Objective    Vitals - Patient Reported  Pain Score: No Pain (0)      Vitals:  No vitals were obtained today due to virtual visit.    Physical Exam   GENERAL: Healthy, alert and no distress  EYES: Eyes grossly normal to inspection.  No discharge or erythema, or obvious scleral/conjunctival abnormalities.  RESP: No audible wheeze, cough, or visible cyanosis.  No visible retractions or increased work of breathing.    SKIN: Visible skin clear. No significant rash, abnormal pigmentation or lesions.  NEURO: Cranial nerves grossly intact.  Mentation and speech appropriate for age.  PSYCH: Mentation appears normal, affect normal/bright, judgement and insight intact, normal speech and appearance well-groomed.    Past labs reviewed with the patient.             Video-Visit Details    Type of service:  Video Visit     Originating Location (pt. Location): Home    Distant Location (provider location):  Off-site  Platform used for Video Visit: "[employer]

## 2023-11-15 DIAGNOSIS — G43.909 MIGRAINE WITHOUT STATUS MIGRAINOSUS, NOT INTRACTABLE, UNSPECIFIED MIGRAINE TYPE: ICD-10-CM

## 2023-11-15 RX ORDER — BUTALBITAL, ACETAMINOPHEN AND CAFFEINE 50; 325; 40 MG/1; MG/1; MG/1
1 TABLET ORAL EVERY 4 HOURS PRN
Qty: 30 TABLET | Refills: 0 | Status: SHIPPED | OUTPATIENT
Start: 2023-11-15 | End: 2024-01-17

## 2024-01-17 DIAGNOSIS — G47.26 SHIFT WORK SLEEP DISORDER: ICD-10-CM

## 2024-01-17 DIAGNOSIS — G43.909 MIGRAINE WITHOUT STATUS MIGRAINOSUS, NOT INTRACTABLE, UNSPECIFIED MIGRAINE TYPE: ICD-10-CM

## 2024-01-17 RX ORDER — METHYLPHENIDATE HYDROCHLORIDE 54 MG/1
TABLET, EXTENDED RELEASE ORAL
Qty: 30 TABLET | Refills: 0 | Status: SHIPPED | OUTPATIENT
Start: 2024-01-17

## 2024-01-17 RX ORDER — BUTALBITAL, ACETAMINOPHEN AND CAFFEINE 50; 325; 40 MG/1; MG/1; MG/1
TABLET ORAL
Qty: 30 TABLET | Refills: 0 | Status: SHIPPED | OUTPATIENT
Start: 2024-01-17

## 2024-01-18 ENCOUNTER — TELEPHONE (OUTPATIENT)
Dept: FAMILY MEDICINE | Facility: CLINIC | Age: 44
End: 2024-01-18
Payer: COMMERCIAL

## 2024-01-18 DIAGNOSIS — G47.26 SHIFT WORK SLEEP DISORDER: ICD-10-CM

## 2024-01-18 DIAGNOSIS — F51.04 CHRONIC INSOMNIA: Primary | ICD-10-CM

## 2024-01-18 NOTE — TELEPHONE ENCOUNTER
Prior Authorization Retail Medication Request    Medication/Dose: zolpidem (AMBIEN) 5 MG tablet  Diagnosis and ICD code (if different than what is on RX):    New/renewal/insurance change PA/secondary ins. 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

## 2024-01-29 NOTE — TELEPHONE ENCOUNTER
Retail Pharmacy Prior Authorization Team   Phone: [phone]    PA Initiation    Medication: ZOLPIDEM TARTRATE 5 MG PO TABS  Insurance Company: [employer] ([facility]) - Phone [phone] Fax [phone]  Pharmacy Filling the Rx: [facility] [place] - [address] [place]  Filling Pharmacy Phone: [phone]  Filling Pharmacy Fax:    Start Date: 1/29/2024

## 2024-01-30 RX ORDER — ZOLPIDEM TARTRATE 10 MG/1
10 TABLET ORAL
Qty: 30 TABLET | Refills: 5 | Status: SHIPPED | OUTPATIENT
Start: 2024-01-30

## 2024-01-30 NOTE — TELEPHONE ENCOUNTER
Note: Due to high request volumes our turn-around is time taking longer than normal . We are working diligently to submit all requests in a timely manner and by the order they are received.  If you have questions - please send a note/message in the active PA encounter and send back to the RPPA (Retail Pharmacy Prior Authorization) team [[id number]].    Thank you!       PRIOR AUTHORIZATION DENIED    Medication: ZOLPIDEM TARTRATE 5 MG PO TABS  Insurance Company: [employer] ([facility]) - Phone [phone] Fax [phone]  Denial Date: 1/29/2024  Denial Rational:         Appeal Information:   If provider would like to appeal please review the plan's reasons for denial listed above. Please utilize that information to complete letter and provide specific, detailed clinical information/rationale of your patient's health status to address their denial reasons.          Patient Notified: No

## 2024-03-18 ENCOUNTER — OFFICE VISIT (OUTPATIENT)
Dept: FAMILY MEDICINE | Facility: CLINIC | Age: 44
End: 2024-03-18
Payer: COMMERCIAL

## 2024-03-18 VITALS
RESPIRATION RATE: 18 BRPM | HEIGHT: 59 IN | OXYGEN SATURATION: 96 % | WEIGHT: 151.1 LBS | DIASTOLIC BLOOD PRESSURE: 80 MMHG | BODY MASS INDEX: 30.46 KG/M2 | SYSTOLIC BLOOD PRESSURE: 121 MMHG | TEMPERATURE: 97.1 F | HEART RATE: 90 BPM

## 2024-03-18 DIAGNOSIS — R10.2 PELVIC PAIN IN FEMALE: ICD-10-CM

## 2024-03-18 DIAGNOSIS — N93.9 VAGINAL BLEEDING: ICD-10-CM

## 2024-03-18 DIAGNOSIS — N63.42 SUBAREOLAR MASS OF LEFT BREAST: Primary | ICD-10-CM

## 2024-03-18 PROCEDURE — 99214 OFFICE O/P EST MOD 30 MIN: CPT | Performed by: NURSE PRACTITIONER

## 2024-03-18 ASSESSMENT — PAIN SCALES - GENERAL: PAINLEVEL: NO PAIN (0)

## 2024-03-18 NOTE — PROGRESS NOTES
Kathi Murphy is a 43 year old, presenting for the following health issues:  Breast Mass        3/18/2024     1:41 PM   Additional Questions   Roomed by Pilar   Accompanied by self     History of Present Illness       Reason for visit:  Lump on left breast  Symptom onset:  3-4 weeks ago  Symptom intensity:  Mild  Symptom progression:  Worsening  Had these symptoms before:  No    She eats 4 or more servings of fruits and vegetables daily.She consumes 1 sweetened beverage(s) daily.She exercises with enough effort to increase her heart rate 20 to 29 minutes per day.  She exercises with enough effort to increase her heart rate 3 or less days per week.   She is taking medications regularly.         Breast Concern  Onset/Duration: 2 months   Description:   Location: under nipple on the left   Pain or tenderness: YES under the axilla   Redness:  no   Intensity: mild  Progression of Symptoms: worsening  Accompanying Signs & Symptoms:  Any lumps in axillary region:  no   Movable: YES  Nipple discharge: none   Changes in the skin or nipple: none   On Hormone therapy:  no   Does it change with menstrual cycle:  no   Previous history of similar problem:   First degree relative with breast cancer: adopted so does not know   Precipitating factors:           Worsened by: nothing   Alleviating factors:            Improved by: nothing   Therapies tried and outcome: None  Patient's last menstrual period was 01/06/2013.    Labs reviewed in EPIC  BP Readings from Last 3 Encounters:   03/18/24 121/80   04/23/21 112/70   07/12/17 93/46    Wt Readings from Last 3 Encounters:   03/18/24 68.5 kg (151 lb 1.6 oz)   04/23/21 78 kg (172 lb)   07/10/17 71.4 kg (157 lb 6.4 oz)                  Patient Active Problem List   Diagnosis    Rash    Migraine without status migrainosus, not intractable, unspecified migraine type    CARDIOVASCULAR SCREENING; LDL GOAL LESS THAN 160    Shift work sleep disorder    Pelvic pain in female     "PTSD (post-traumatic stress disorder)     Past Surgical History:   Procedure Laterality Date    DILATION AND CURETTAGE      DILATION AND CURETTAGE      HYSTERECTOMY, SUPRACERVICAL LAPAROSCOPIC  01/14/2016    with right salpingectomy for menorrhagia w/ irregular cycle, complicated by postoperative bleeding for several weeks, fever, UTI    HYSTEROSCOPY DIAGNOSTIC      polypectomy    Laparoscopic salpingectomy Left     [place] teeth extraction         Social History     Tobacco Use    Smoking status: Never    Smokeless tobacco: Never   Substance Use Topics    Alcohol use: No     No family history on file.      Current Outpatient Medications   Medication Sig Dispense Refill    butalbital-acetaminophen-caffeine (ESGIC) -40 MG tablet Take 1 tablet by mouth every 4 (four) hours as needed for headaches 30 tablet 0    ibuprofen (ADVIL/MOTRIN) 600 MG tablet Take 1 tablet (600 mg) by mouth every 6 hours as needed for moderate pain 30 tablet 0    methylphenidate (CONCERTA) 54 MG CR tablet Take 1 tablet (54 mg) by mouth every morning 30 tablet 0    Methylphenidate HCl (METHYLPHENIDATE ER, NON-OSM,) 54 MG 24H tablet Take 1 tablet (54 mg) by mouth once daily. 30 tablet 0    zolpidem (AMBIEN) 10 MG tablet Take 1 tablet (10 mg) by mouth nightly as needed for sleep 30 tablet 5     Allergies   Allergen Reactions    Contrast Dye Anaphylaxis    Prednisone Anaphylaxis    Provigil [Modafinil] Hives    Tylenol W/Codeine [Acetaminophen-Codeine] Cramps             Review of Systems  Constitutional, HEENT, cardiovascular, pulmonary, gi and gu systems are negative, except as otherwise noted.      Objective    Ht 1.505 m (4' 11.25\")   Wt 68.5 kg (151 lb 1.6 oz)   LMP 01/06/2013   BMI 30.26 kg/m    Body mass index is 30.26 kg/m .  Physical Exam   GENERAL APPEARANCE: healthy, alert, and no distress  NECK: no adenopathy  RESP: lungs clear to auscultation - no rales, rhonchi or wheezes  BREAST: no palpable axillary masses or adenopathy and " "mass left breast lateral to nipple approximately 1 cm in size   CV: regular rates and rhythm  MS: extremities normal- no gross deformities noted  SKIN: no suspicious lesions or rashes  PSYCH: mentation appears normal and affect normal/bright  MENTAL STATUS EXAM:  Appearance/Behavior: No apparent distress, Casually groomed, and Dressed appropriately for weather  Speech: Normal  Mood/Affect: normal affect  Insight: Adequate      Pending orders and results        Assessment & Plan     Subareolar mass of left breast  Will follow up and/or notify patient of  results via My Chart to determine further need for followup  - US Breast Left Limited 1-3 Quadrants  - MA Diagnostic Bilateral w/Lazaro    Vaginal bleeding  Will follow up and/or notify patient of  results via My Chart to determine further need for followup   - US Pelvic Transabdominal and Transvaginal  - Ob/Gyn  Referral    Pelvic pain in female  Will follow up and/or notify patient of  results via My Chart to determine further need for followup   - US Pelvic Transabdominal and Transvaginal  - Ob/Gyn  Referral      Ordering of each unique test  Prescription drug management   Time spent by me doing chart review, history and exam, documentation and further activities per the note      BMI  Estimated body mass index is 30.26 kg/m  as calculated from the following:    Height as of this encounter: 1.505 m (4' 11.25\").    Weight as of this encounter: 68.5 kg (151 lb 1.6 oz).         See Patient Instructions  Patient Instructions   PLAN:   1.   Orders Placed This Encounter   Procedures    MA Diagnostic Digital Bilateral    US Breast Left Limited 1-3 Quadrants    US Pelvic Transabdominal and Transvaginal    Ob/Gyn  Referral       2.  FURTHER TESTING:       - left breast  ultrasound       - mammogram diagnostic   Pelvic ultrasound  Referral to GYN   Will follow up and/or notify patient of  results via My Chart to determine further need for followup "    Patient needs to follow up in if no improvement,or sooner if worsening of symptoms or other symptoms develop.         Signed Electronically by: ESTEFANY Obregon CNP

## 2024-03-18 NOTE — PATIENT INSTRUCTIONS
PLAN:   1.   Orders Placed This Encounter   Procedures    MA Diagnostic Digital Bilateral    US Breast Left Limited 1-3 Quadrants    US Pelvic Transabdominal and Transvaginal    Ob/Gyn  Referral       2.  FURTHER TESTING:       - left breast  ultrasound       - mammogram diagnostic   Pelvic ultrasound  Referral to GYN   Will follow up and/or notify patient of  results via My Chart to determine further need for followup    Patient needs to follow up in if no improvement,or sooner if worsening of symptoms or other symptoms develop.

## 2024-03-24 ENCOUNTER — HEALTH MAINTENANCE LETTER (OUTPATIENT)
Age: 44
End: 2024-03-24

## 2024-03-26 ENCOUNTER — ANCILLARY PROCEDURE (OUTPATIENT)
Dept: ULTRASOUND IMAGING | Facility: CLINIC | Age: 44
End: 2024-03-26
Attending: NURSE PRACTITIONER
Payer: COMMERCIAL

## 2024-03-26 ENCOUNTER — ANCILLARY PROCEDURE (OUTPATIENT)
Dept: MAMMOGRAPHY | Facility: CLINIC | Age: 44
End: 2024-03-26
Attending: NURSE PRACTITIONER
Payer: COMMERCIAL

## 2024-03-26 DIAGNOSIS — N63.42 SUBAREOLAR MASS OF LEFT BREAST: ICD-10-CM

## 2024-03-26 DIAGNOSIS — R10.2 PELVIC PAIN IN FEMALE: ICD-10-CM

## 2024-03-26 DIAGNOSIS — N93.9 VAGINAL BLEEDING: ICD-10-CM

## 2024-03-26 PROCEDURE — 76830 TRANSVAGINAL US NON-OB: CPT | Performed by: RADIOLOGY

## 2024-03-26 PROCEDURE — G0279 TOMOSYNTHESIS, MAMMO: HCPCS | Performed by: RADIOLOGY

## 2024-03-26 PROCEDURE — 76856 US EXAM PELVIC COMPLETE: CPT | Performed by: RADIOLOGY

## 2024-03-26 PROCEDURE — 76642 ULTRASOUND BREAST LIMITED: CPT | Mod: LT | Performed by: RADIOLOGY

## 2024-03-26 PROCEDURE — 77066 DX MAMMO INCL CAD BI: CPT | Performed by: RADIOLOGY

## 2024-03-28 NOTE — RESULT ENCOUNTER NOTE
Derrek Kramer,    Attached are your test results.  Nothing on ultrasound to explain vaginal bleeding keep appointment with GYN as planned    Please contact us if you have any questions.    Netta Medley, CNP

## 2024-04-04 DIAGNOSIS — G47.26 SHIFT WORK SLEEP DISORDER: ICD-10-CM

## 2024-04-04 RX ORDER — ZOLPIDEM TARTRATE 5 MG/1
5-10 TABLET ORAL
Qty: 60 TABLET | Refills: 5 | OUTPATIENT
Start: 2024-04-04

## 2024-06-02 ENCOUNTER — HEALTH MAINTENANCE LETTER (OUTPATIENT)
Age: 44
End: 2024-06-02

## 2024-06-19 DIAGNOSIS — G47.26 SHIFT WORK SLEEP DISORDER: ICD-10-CM

## 2024-06-19 RX ORDER — ZOLPIDEM TARTRATE 5 MG/1
5-10 TABLET ORAL
Qty: 60 TABLET | Refills: 2 | Status: SHIPPED | OUTPATIENT
Start: 2024-06-19

## 2024-11-15 DIAGNOSIS — G47.26 SHIFT WORK SLEEP DISORDER: ICD-10-CM

## 2024-11-15 RX ORDER — ZOLPIDEM TARTRATE 10 MG/1
TABLET ORAL
Qty: 30 TABLET | Refills: 0 | Status: SHIPPED | OUTPATIENT
Start: 2024-11-15

## 2024-12-09 NOTE — TELEPHONE ENCOUNTER
Called and informed patient that Dr. Gambino will review when he returns to the clinic.  Elke Pagan     
I can't find any documentation of plan for ambien in the chart. Has only been rx'd one 30 day prescription in April.  Will need to defer to pcp for refill- he will return next week. For review.     Please update patient.   
Refilled   
Routing refill request to provider for review/approval because:  Drug not on the [facility] refill protocol     Rosalba MARTINEZN, RN          
contact guard

## 2024-12-19 ENCOUNTER — VIRTUAL VISIT (OUTPATIENT)
Dept: FAMILY MEDICINE | Facility: CLINIC | Age: 44
End: 2024-12-19
Payer: COMMERCIAL

## 2024-12-19 DIAGNOSIS — G43.909 MIGRAINE WITHOUT STATUS MIGRAINOSUS, NOT INTRACTABLE, UNSPECIFIED MIGRAINE TYPE: Primary | ICD-10-CM

## 2024-12-19 DIAGNOSIS — G47.26 SHIFT WORK SLEEP DISORDER: ICD-10-CM

## 2024-12-19 DIAGNOSIS — F51.04 CHRONIC INSOMNIA: ICD-10-CM

## 2024-12-19 RX ORDER — LISDEXAMFETAMINE DIMESYLATE 50 MG/1
50 CAPSULE ORAL EVERY MORNING
Qty: 30 CAPSULE | Refills: 0 | Status: SHIPPED | OUTPATIENT
Start: 2024-12-19

## 2024-12-19 RX ORDER — ZOLPIDEM TARTRATE 10 MG/1
10 TABLET ORAL
Qty: 30 TABLET | Refills: 5 | Status: SHIPPED | OUTPATIENT
Start: 2024-12-19

## 2024-12-19 RX ORDER — BUTALBITAL, ACETAMINOPHEN AND CAFFEINE 50; 325; 40 MG/1; MG/1; MG/1
1 TABLET ORAL 4 TIMES DAILY PRN
Qty: 30 TABLET | Refills: 2 | Status: SHIPPED | OUTPATIENT
Start: 2024-12-19

## 2024-12-19 NOTE — PROGRESS NOTES
"  If patient has telephone visit, have they been educated on video visit as preferred visit method and offered to change to video visit? N/A        Instructions Relayed to Patient by Virtual Roomer:     Patient is active on [employer]:   Relayed following to patient: \"It looks like you are active on [employer], are you able to join the visit this way? If not, do you need us to send you a link now or would you like your provider to send a link via text or email when they are ready to initiate the visit?\"      Patient Confirmed they will join visit via: [employer]  Reminded patient to ensure they were logged on to virtual visit by arrival time listed.   Asked if patient has flexibility to initiate visit sooner than arrival time: patient is unable to initiate visit earlier than arrival time     If pediatric virtual visit, ensured pediatric patient along with parent/guardian will be present for video visit.     Patient offered the website [email or web address] and/or phone number to [employer] Help line: [phone]      Katherine is a 44 year old who is being evaluated via a billable video visit.    How would you like to obtain your AVS? [employer]  If the video visit is dropped, the invitation should be resent by: Text to cell phone: [phone]  Will anyone else be joining your video visit? No      Assessment & Plan     Migraine without status migrainosus, not intractable, unspecified migraine type  Stable on current regimen.  Continue same plan and routine follow-up.   - butalbital-acetaminophen-caffeine (ESGIC) -40 MG tablet; Take 1 tablet by mouth 4 times daily as needed for headaches.    Chronic insomnia  Stable on current regimen.  Continue same plan and routine follow-up.   - zolpidem (AMBIEN) 10 MG tablet; Take 1 tablet (10 mg) by mouth nightly as needed for sleep.    Shift work sleep disorder  Intermittent usage related to her work schedule.  We are going to try Vyvanse in place of the methylphenidate " "for smoother and longer action.  - lisdexamfetamine (VYVANSE) 50 MG capsule; Take 1 capsule (50 mg) by mouth every morning.          BMI  Estimated body mass index is 30.26 kg/m  as calculated from the following:    Height as of 3/18/24: 1.505 m (4' 11.25\").    Weight as of 3/18/24: 68.5 kg (151 lb 1.6 oz).         See Patient Instructions    Subjective   Katherine is a 44 year old, presenting for the following health issues:  Recheck Medication        12/19/2024     7:04 AM   Additional Questions   Roomed by Prema WARNER   Accompanied by Self     History of Present Illness       Reason for visit:  Medication refills    She eats 4 or more servings of fruits and vegetables daily.She consumes 1 sweetened beverage(s) daily.She exercises with enough effort to increase her heart rate 20 to 29 minutes per day.  She exercises with enough effort to increase her heart rate 4 days per week.   She is taking medications regularly.     Migraine   Since your last clinic visit, how have your headaches changed?  Worsened  How often are you getting headaches or migraines? Daily to every other day    Are you able to do normal daily activities when you have a migraine? Yes  Are you taking rescue/relief medications? (Select all that apply) ibuprofen (Advil, Motrin) and Other: ESCIG  How helpful is your rescue/relief medication?  I get some relief  Are you taking any medications to prevent migraines? (Select all that apply)  No  In the past 4 weeks, how often have you gone to urgent care or the emergency room because of your headaches?  0    Medication Followup of Methylephenidate HCl, Ambien  Taking Medication as prescribed: yes  Side Effects:  None  Medication Helping Symptoms:  yes    Video visit patient today for the above.  Doing well.  Reports no interval health concerns.        Review of Systems  Constitutional, HEENT, cardiovascular, pulmonary, gi and gu systems are negative, except as otherwise noted.      Objective     "       Vitals:  No vitals were obtained today due to virtual visit.    Physical Exam   GENERAL: alert and no distress  EYES: Eyes grossly normal to inspection.  No discharge or erythema, or obvious scleral/conjunctival abnormalities.  RESP: No audible wheeze, cough, or visible cyanosis.    SKIN: Visible skin clear. No significant rash, abnormal pigmentation or lesions.  NEURO: Cranial nerves grossly intact.  Mentation and speech appropriate for age.  PSYCH: Appropriate affect, tone, and pace of words    Past labs reviewed with the patient.       Video-Visit Details    Type of service:  Video Visit   Originating Location (pt. Location): Home    Distant Location (provider location):  Off-site  Platform used for Video Visit: Edgar  Signed Electronically by: Margret Gambino MD

## 2025-05-20 ENCOUNTER — PATIENT OUTREACH (OUTPATIENT)
Dept: CARE COORDINATION | Facility: CLINIC | Age: 45
End: 2025-05-20
Payer: COMMERCIAL

## 2025-06-14 ENCOUNTER — HEALTH MAINTENANCE LETTER (OUTPATIENT)
Age: 45
End: 2025-06-14

## 2025-06-18 DIAGNOSIS — F51.04 CHRONIC INSOMNIA: ICD-10-CM

## 2025-06-18 RX ORDER — ZOLPIDEM TARTRATE 10 MG/1
10 TABLET ORAL
Qty: 30 TABLET | Refills: 5 | Status: SHIPPED | OUTPATIENT
Start: 2025-06-18

## 2025-08-09 DIAGNOSIS — G43.909 MIGRAINE WITHOUT STATUS MIGRAINOSUS, NOT INTRACTABLE, UNSPECIFIED MIGRAINE TYPE: ICD-10-CM

## 2025-08-10 RX ORDER — BUTALBITAL, ACETAMINOPHEN AND CAFFEINE 50; 325; 40 MG/1; MG/1; MG/1
TABLET ORAL
Qty: 30 TABLET | Refills: 0 | Status: SHIPPED | OUTPATIENT
Start: 2025-08-10